# Patient Record
Sex: MALE | Race: WHITE
[De-identification: names, ages, dates, MRNs, and addresses within clinical notes are randomized per-mention and may not be internally consistent; named-entity substitution may affect disease eponyms.]

---

## 2020-05-10 ENCOUNTER — HOSPITAL ENCOUNTER (INPATIENT)
Dept: HOSPITAL 88 - ER | Age: 75
LOS: 5 days | Discharge: HOME | DRG: 603 | End: 2020-05-15
Attending: FAMILY MEDICINE | Admitting: FAMILY MEDICINE
Payer: MEDICARE

## 2020-05-10 VITALS — DIASTOLIC BLOOD PRESSURE: 86 MMHG | SYSTOLIC BLOOD PRESSURE: 128 MMHG

## 2020-05-10 VITALS — DIASTOLIC BLOOD PRESSURE: 78 MMHG | SYSTOLIC BLOOD PRESSURE: 119 MMHG

## 2020-05-10 VITALS — SYSTOLIC BLOOD PRESSURE: 119 MMHG | DIASTOLIC BLOOD PRESSURE: 78 MMHG

## 2020-05-10 VITALS — WEIGHT: 23 LBS | HEIGHT: 76 IN | BODY MASS INDEX: 2.8 KG/M2

## 2020-05-10 VITALS — SYSTOLIC BLOOD PRESSURE: 128 MMHG | DIASTOLIC BLOOD PRESSURE: 86 MMHG

## 2020-05-10 DIAGNOSIS — J44.9: ICD-10-CM

## 2020-05-10 DIAGNOSIS — S61.250D: ICD-10-CM

## 2020-05-10 DIAGNOSIS — E78.5: ICD-10-CM

## 2020-05-10 DIAGNOSIS — I25.10: ICD-10-CM

## 2020-05-10 DIAGNOSIS — M35.3: ICD-10-CM
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
## 2020-05-10 DIAGNOSIS — L03.113: Primary | ICD-10-CM

## 2020-05-10 DIAGNOSIS — N17.9: ICD-10-CM

## 2020-05-10 LAB
ALBUMIN SERPL-MCNC: 3 G/DL (ref 3.5–5)
ALBUMIN/GLOB SERPL: 0.9 {RATIO} (ref 0.8–2)
ALP SERPL-CCNC: 89 IU/L (ref 40–150)
ALT SERPL-CCNC: 13 IU/L (ref 0–55)
ANION GAP SERPL CALC-SCNC: 13.3 MMOL/L (ref 8–16)
BASOPHILS # BLD AUTO: 0.1 10*3/UL (ref 0–0.1)
BASOPHILS NFR BLD AUTO: 0.4 % (ref 0–1)
BUN SERPL-MCNC: 21 MG/DL (ref 7–26)
BUN/CREAT SERPL: 15 (ref 6–25)
CALCIUM SERPL-MCNC: 8.2 MG/DL (ref 8.4–10.2)
CHLORIDE SERPL-SCNC: 109 MMOL/L (ref 98–107)
CO2 SERPL-SCNC: 24 MMOL/L (ref 22–29)
DEPRECATED NEUTROPHILS # BLD AUTO: 12.5 10*3/UL (ref 2.1–6.9)
EGFRCR SERPLBLD CKD-EPI 2021: 48 ML/MIN (ref 60–?)
EOSINOPHIL # BLD AUTO: 0 10*3/UL (ref 0–0.4)
EOSINOPHIL NFR BLD AUTO: 0.1 % (ref 0–6)
ERYTHROCYTE [DISTWIDTH] IN CORD BLOOD: 22.8 % (ref 11.7–14.4)
GLOBULIN PLAS-MCNC: 3.4 G/DL (ref 2.3–3.5)
GLUCOSE SERPLBLD-MCNC: 105 MG/DL (ref 74–118)
HCT VFR BLD AUTO: 44.8 % (ref 38.2–49.6)
HGB BLD-MCNC: 13.7 G/DL (ref 14–18)
LYMPHOCYTES # BLD: 1 10*3/UL (ref 1–3.2)
LYMPHOCYTES NFR BLD AUTO: 7.3 % (ref 18–39.1)
MCH RBC QN AUTO: 25.9 PG (ref 28–32)
MCHC RBC AUTO-ENTMCNC: 30.6 G/DL (ref 31–35)
MCV RBC AUTO: 84.7 FL (ref 81–99)
MONOCYTES # BLD AUTO: 0.3 10*3/UL (ref 0.2–0.8)
MONOCYTES NFR BLD AUTO: 2.1 % (ref 4.4–11.3)
NEUTS SEG NFR BLD AUTO: 88.6 % (ref 38.7–80)
PLATELET # BLD AUTO: 250 X10E3/UL (ref 140–360)
POTASSIUM SERPL-SCNC: 4.3 MMOL/L (ref 3.5–5.1)
RBC # BLD AUTO: 5.29 X10E6/UL (ref 4.3–5.7)
SODIUM SERPL-SCNC: 142 MMOL/L (ref 136–145)

## 2020-05-10 PROCEDURE — 87040 BLOOD CULTURE FOR BACTERIA: CPT

## 2020-05-10 PROCEDURE — 85025 COMPLETE CBC W/AUTO DIFF WBC: CPT

## 2020-05-10 PROCEDURE — 36415 COLL VENOUS BLD VENIPUNCTURE: CPT

## 2020-05-10 PROCEDURE — 80048 BASIC METABOLIC PNL TOTAL CA: CPT

## 2020-05-10 PROCEDURE — 87635 SARS-COV-2 COVID-19 AMP PRB: CPT

## 2020-05-10 PROCEDURE — 94640 AIRWAY INHALATION TREATMENT: CPT

## 2020-05-10 PROCEDURE — 99284 EMERGENCY DEPT VISIT MOD MDM: CPT

## 2020-05-10 PROCEDURE — 80053 COMPREHEN METABOLIC PANEL: CPT

## 2020-05-10 RX ADMIN — Medication SCH MG: at 20:30

## 2020-05-10 RX ADMIN — CEFEPIME SCH MLS/HR: 1 INJECTION, SOLUTION INTRAVENOUS at 16:19

## 2020-05-10 RX ADMIN — MUPIROCIN SCH GM: 20 OINTMENT TOPICAL at 20:31

## 2020-05-10 RX ADMIN — GUAIFENESIN AND DEXTROMETHORPHAN HYDROBROMIDE SCH EACH: 600; 30 TABLET, EXTENDED RELEASE ORAL at 20:30

## 2020-05-10 RX ADMIN — AMIODARONE HYDROCHLORIDE SCH MG: 200 TABLET ORAL at 20:31

## 2020-05-10 RX ADMIN — CARVEDILOL SCH MG: 3.12 TABLET, FILM COATED ORAL at 20:30

## 2020-05-10 RX ADMIN — FAMOTIDINE SCH MG: 20 TABLET, FILM COATED ORAL at 20:30

## 2020-05-10 NOTE — NUR
Received pt fro ER at 1645 in wheelchair. Pt is aox3 and able to verbalize needs. Denies any 
pain at this time. 0 s/s of acute distress noted at time of admission.  Pt is being admitted 
for cellulitis of right index finger. Notified Dr. Hannon of pt arrival to unit and received 
orders to restart all home medications. Spoke with daughter update her on patient condition.

## 2020-05-10 NOTE — DIAGNOSTIC IMAGING REPORT
Exam:  Finger 3 views



History:  Pain



Comparison: None.



Findings: No fracture or malalignment. Joint spaces preserved. No abnormal soft

tissue calcification or soft tissue defect.  Soft tissue swelling of the index

finger.



Impression:



No acute osseous abnormality. Soft tissue swelling.





Signed by: Dr. Andrew Palisch, M.D. on 5/10/2020 3:43 PM

## 2020-05-10 NOTE — NUR
RECEIVED PATIENT IN BEDSIDE SHIFT REPORT. PATIENT RESTING IN BED AT THIS TIME. NO PAIN 
REPORTED. REDNESS NOTED TO R INDEX FINGER AND SOME REDNESS GOING UP ARM. NO S&S OF DISTRESS 
NOTED. BED LOCKED IN LOWEST POSITION, SIDE RAILS UPX2, CALL LIGHT IN REACH.

## 2020-05-10 NOTE — XMS REPORT
Patient Summary Document

                             Created on: 05/10/2020



ALFREDA JENKINS

External Reference #: [id number]

: 1945

Sex: Male



Demographics





                          Address                   [address] [place]  [address]

 

                          Home Phone                [phone]

 

                          Preferred Language        Unknown

 

                          Marital Status            Unknown

 

                          Protestant Affiliation     Unknown

 

                          Race                      Unknown

 

                                        Additional Race(s)  

 

                          Ethnic Group              Unknown





Author





                          Author                    [facility] [place] [place]

 

                          Organization              [facility] [place] [place]

 

                          Address                   Unknown

 

                          Phone                     Unavailable







Support





                Name            Relationship    Address         Phone

 

                    LEILANIANA [place]  [address]                    [phone]

 

                    JOSE MANUEL BUTTERFIELD     [place]  [address]                    [phone]

 

                    [place]  [address]                    [phone]

 

                    [place]  [address]                    [phone]

 

                    [place]  [address]                    [phone]

 

                MARIO JENKINS   [place], [place]  [id number] [phone]







Care Team Providers





                    Care Team Member Name Role                Phone

 

                    SWEET, A LAIRD      Unavailable         Unavailable







Payers





             Payer Name   Policy Type  Policy Number Effective Date Expiration D

ate







Problems

This patient has no known problems.



Allergies, Adverse Reactions, Alerts





        Allergy Name Allergy Type Status  Severity Reaction(s) Onset Date Inacti

ve Date 

Treating Clinician                      Comments

 

        Penicillins DA      Active  MO              2018 00:00:00         

         

 

        Sulfa (Sulfonamide Antibiotics) DA      Active  U                00:00:00                  

 

        tetracycline DA      Active  U               2018 00:00:00        

          

 

        celecoxib DA      Active  U               2018 00:00:00           

       

 

        Penicillins DA      Active  MO              2018 00:00:00         

         

 

        Sulfa (Sulfonamide Antibiotics) DA      Active  U                00:00:00                  

 

        tetracycline DA      Active  U               2018 00:00:00        

          

 

        celecoxib DA      Active  U               2018 00:00:00           

       

 

        Penicillins DA      Active  MO              2018 00:00:00         

         

 

        Sulfa (Sulfonamide Antibiotics) DA      Active  U                00:00:00                  

 

        tetracycline DA      Active  U               2018 00:00:00        

          

 

        celecoxib DA      Active  U               2018 00:00:00           

       

 

        Penicillins DA      Active  MO              2018 00:00:00         

         

 

        Sulfa (Sulfonamide Antibiotics) DA      Active  U                00:00:00                  

 

        tetracycline DA      Active  U               2018 00:00:00        

          

 

        celecoxib DA      Active  U               2018 00:00:00           

       







Medications

This patient has no known medications.



Results





           Test Description Test Time  Test Comments Text Results Atomic Results

 Result 

Comments

 

                FINGER RIGHT    2020-05-10 15:42:00                             

                                 

                                        [place] [address]      
Patient Name: ALFREDA JENKINS                                MR #: [id number]  
               : 1945                                   Age/Sex: 74/M  
Acct #: [id number]                              Req #: [id number]  Adm 
Physician:                                                      Ordered by: 
ALFREDA ZHANG NP                         Report #: [id number]        Location:
[facility]                                      Room/Bed:                   
________________________________________________________________________________

___________________    Procedure: [id number] DX/FINGER RIGHT  Exam Date: 05/10/20
                           Exam Time: 1450                                      
       REPORT STATUS: Signed       Exam:  Finger 3 views      History:  Pain    
 Comparison: None.      Findings: No fracture or malalignment. Joint spaces 
preserved. No abnormal soft   tissue calcification or soft tissue defect.  Soft 
tissue swelling of the index   finger.      Impression:      No acute osseous 
abnormality. Soft tissue swelling.         Signed by: Dr. Andrew Palisch, M.D. 
on 5/10/2020 3:43 PM        Dictated By: ANDREW R PALISCH MD  Electronically 
Signed By: ANDREW R PALISCH MD on 05/10/20 2349  Transcribed By: DAVINA on 
05/10/20 5725       COPY TO:   ALFREDA ZAHNG NP           

 

                LUNG,BIOPSY     2019 15:20:00                 

--------------------------------------------------------------------------------

------------RUN DATE: 19                         [facility]           
              PAGE 1   RUN TIME: 1520                            Specimen 
Inquiry                    RUN USER: INTERFACE                                  
                        
----------------------------------------------------------------
----------------------------PATIENT: ALFREDA JENKINS              ACCT #: 
[id number] LOC:  JOSE       #: [id number]                                   
   AGE/SX: 74/M         ROOM: [facility]     RE19REG DR:  Selvin Hannon MD            :    [birth year]     BED:  B          DIS: 19               
                       STATUS: DIS IN       TLOC:           
----------------------------
---------------------------------------------------------------- SPEC #: 
[id number]     RECD:      STATUS:  NINOSKA           REALEXANDR #: 
[id number]                           TORRES: 19-         [facility] DR: 
Von Eason MD            ENTERED:      SP TYPE: LUNG BX      
 OTHR DR: Romero Baugh MD, David A MD Vasquez Donado, Andres F MDORDERED:  GROSS                       
                                                      COPIES TO:   
Von Eason MD   [address] [place] [address] [phone]    
Romero Baugh MD   [address]   [address]   [place] [address] [phone]    Star Sierra MD   [address] [facility] [place] [address]   
[phone]    Rashaun Wyman MD   [address]   
[place] [address]   [phone] PROCEDURES: GROSS () TISSUES:    
      RIGHT LUNG, NOS - MASS BX         CLINICAL HISTORY    COLLECTION DATE: 
2019       HISTORY OF LUNG MASS, POSSIBLE TUBERCULOSIS (TB)             
COMMENT   Clinical correlation is recommended.                                  
 ** CONTINUED ON NEXT PAGE ** 
--------------------------------------------------------------------------------

------------RUN DATE: 19                         [facility]           
              PAGE 2   RUN TIME: 1520                            Specimen 
Inquiry                    RUN USER: INTERFACE                                  
                        --------------------
------------------------------------------------------------------------SPEC #: 
[id number]    PATIENT: ALFREDA JENKINS               #[id number]  
(Continued)--------------------------------------
------------------------------------------------------          FINAL DIAGNOSIS 
  Right lung mass, core biopsy and touch preps:        LUNG TISSUE WITH CHRONIC 
INFLAMMATION, INCREASED FIBROUS TISSUE AND          A NECROTIZING GRANULOMA     
  NEGATIVE FOR ACID FAST AND FUNGAL ORGANISMS        NEGATIVE FOR MALIGNANCY    
      [facility]/   D   [id number], [id number], [id number]           MACROSCOPIC    The specimen is
received in formalin and labeled with the patient's name and   identified as 
"right lung mass bx", and consists of multiple tan soft core   biopsies ranging 
from 0.3 to 0.8 cm.  The specimen is filtered and entirely   submitted in a 
single cassette.  Also received are three touch prep slides for   cytologic 
evaluation.           GROSS PERFORMED AT [facility]   [address] [place] [address]   
[phone]           MICROSCOPIC    The sections demonstrate lung tissue 
with chronic inflammation consisting of   lymphocytes and plasma cells.  There 
is an increased amount of fibrous tissue   present in the alveolar walls.  There
is an area of necrotizing granuloma   formation.  An acid fast and a GMS stain 
are prepared on the core biopsy and an   acid fast stain is prepared on one the 
touch prep slides and a GMS stain is   prepared on another touch prep slide.  
The remaining touch prep slide is   stained with a PAP stain and demonstrates 
benign pulmonary macrophages, some   white blood cells, and red blood cells, and
a giant cell.  The GMS and acid   fast stains are negative in the touch preps 
and in the core biopsy sections.             All of the stains, including any 
controls performed, stain   appropriately.       MICROSCOPIC PERFORMED AT [facility]   [address] [facility] [place] [address] [phone]                                ** CONTINUED
ON NEXT PAGE ** 
--------------------------------------------------------------------------------

------------RUN DATE: 19                         [facility]           
              PAGE 3   RUN TIME: 1520                            Specimen 
Inquiry                    RUN USER: INTERFACE                                  
                        
--------------------------------------------------------------------------------

------------SPEC #: [id number]    PATIENT: ALFREDA JENKINS               
#[id number]  (Cont
inued)--------------------------------------------------------------------------

------------------          PERFORMING SITE    Diagnosis performed at:        
[facility]      
 [address] [place] [address]        
[phone]--------------------------------------------------------------------

------------------------ Signed SIGNATURE ON FILE                        
Ceci Mirza MD 19 1520 
--------------------------------------------------------------------------------

------------                                                                    
   ** END OF REPORT **                   

 

                AG HISTOPLASMA UA 2019 13:52:00                   

 

   

 

                AG HISTOPLASMA UA (test code = HISUAAG) <0.5 EIA UNIT   <1.0=NEG

         





SOURCE:  [facility] QUANTIFERON [id number] 19:08:00* 



                Test Item       Value           Reference Range Comments

 

                QUANTIFERON TEST (test code = QFTT) Negative        Negative    

    The specimen received for 

QuantiFERON testing was incubatedby the ordering institution.  Specific 
procedures outlinedin our Directory of Services and in the package insert forthe
QuantiFERON Gold (In Tube) test must be followed toenable for proper stimulation
of cells for the productionof interferon gamma.Performed At:  [facility] [id number] Sanjai MD P
h:[id number]





- XR CHEST 1 -80-09 17:52:00 FAX: Selvin De MD   [phone]
   [place]:    St: ADM----------
---------------------------------------------------------------------  Name:   ALFREDA SOLANO                 [facility]                     : 19
[birth year]  Age/S: 74/M           [address] [facility]                Unit #: [id number]    
 Loc: [address]       [place]  [address]              Phys: Rusty Shah MD        
                                              Acct: [id number] Dis Date:      
        Status: ADM IN                                 PHONE #: [phone]    
Exam Date:     2019     1720                   FAX #: [phone]     
Reason: POST LUNG BX                                       EXAMS:               
                               CPT CODE:      [id number] XR CHEST 1 V            
                  11731                            REASON FOR EXAM: POST LUNG BX
              Exam Order Date: 2019 4:56 PM               Ordering M.D.: 
Rusty Shah MD               PROCEDURE:  - XR CHEST 1 V               COMPARISON:
AP chest x-ray earlier today at 12:11 PM               FINDINGS:         Prev
iously seen right apical pneumothorax has resolved. Spiculated       lesion in t
he right upper lobe is stable appearing. Remainder of the       lungs appear chelsea
ssly clear.               Left subclavian vein approach ICD/pacemaker is stable.
Lungs are       clear.               Cardiac mediastinal silhouette is enlarged 
but stable appearing. Bones       are unchanged.                         IMPRES
MARIE: Previously seen right apical pneumothorax has resolved.         Remaining 
findings are unchanged.          ** Electronically Signed by Maxi Coronado MD on 0
2019 at 4964 **                      Reported and signed by: Maxi Coronado MD
              CC: Selvin Hannon                                                
                                                                         Techn
ologist: Kaelyn RIVAS)                                Trnscrd Date/Hoang
e/By: 2019 (606) : By: LocRR31          Orig Print D/T: S: 2019 
(7391)                         PAGE  1                       Signed Report      
                        - CT GUID NDL [id number] 16:20:00  Name: 
ALFREDA JENKINS                  [facility]                     : 
1945 Age/S: 74  / M         [address] [facility]                Unit #: [id number]
[id number]     Loc:               [place]  [address]              Phys: Alana Hannon am, MD                                                  Acct: [id number]  Di
s Date:               Status: ADM IN                                  PHONE #: [phone]
[phone]     Exam Date: 2019                     FAX #: [phone]
[phone]      Reason: LUNG BIOPSY                                         EXAMS:     
                                         CPT CODE:      [id number] CT GUID NDL P
[facility]                          [address]                            REASON FOR EXAM: 
Right upper lobe lung mass.               PROCEDURE: CT-guided biopsy of right u
pper lobe lung mass               Face-to-face procedure time is approximately: 
45 minutes               FINDINGS: After informed consent was obtained, the jolene
ent was brought       to CT and placed supine on the table.   All elements of [facility]       sterile barrier technique were followed.  Prior to the biopsy, axial
      images of the right upper lobe were obtained without intravenous       co
ntrast.  Images of the CT were reviewed and the right upper lobe       mass was 
localized.  A safe pathway was found between the subcutaneous       entry site a
nd the right upper lobe mass. The access site was prepped       and draped in th
e usual sterile fashion. A guiding needle was advanced       into right upper lo
be mass. Multiple core biopsies were then performed       using a 20-gauge biops
y gun. Samples were submitted for cytology,       microbiology, and flow cytomet
ry. The needle was removed and       hemostasis obtained.               MEDICATI
ONS: 1mg versed 25mcg fentanyl               COMPLICATIONS: No immediate        
      Blood loss: Less than 5 mL               Fluoroscopic dose:640 mGy        
        IMPRESSION: Technically successful CT-guided biopsy of right upper      
  lobe mass.          ** Electronically Signed by PEPE Shah on 2019 at 
1620 **                      Reported and signed by: Rusty Shah M.D.         CC: 
Selvin Hannon                                                                  
                                                       Technologist:Senait hernández,RT(R),CT            CTDI:        DLP:        Trnscb Date/Time: 2019 (16
20) LocVTL                        Orig Print D/T: S: 2019 (4272)      P
AGE  1                       Signed Report                               - XR 
CHEST 1 -51-76 12:49:00 FAX: Selvin De MD   [phone]    
[place]:    St: ADM----------
---------------------------------------------------------------------  Name:   ALFREDA SOLANO                 [facility]                     : 19
[birth year]  Age/S: 74/M           [address] [facility]                Unit #: [id number]    
 Loc: [address]       [place]  [address]              Phys: Rusty Shah MD        
                                              Acct: [id number] Dis Date:      
        Status: ADM IN                                 PHONE #: [phone]    
Exam Date:     2019     1211                   FAX #: [phone]     
Reason: POST LUNG BX                                       EXAMS:               
                               CPT CODE:      [id number] XR CHEST 1 V            
                  [address]                    HISTORY: Post lung biopsy.           
   COMPARISON: 2019.               Trace 5% pneumothorax in the right 
apex. Follow-up with expiratory       films for confirmation. Left ICD is unc
hanged. Right upper lobe mass       is ill-defined after biopsy due to surroundi
ng small hemorrhage.       Bibasal subsegmental atelectasis without infiltrates 
or congestion.       Cardiomegaly                 IMPRESSION:                   
Trace 5% pneumothorax in the right apex of the biopsy. Confirmation         with
repeat exam in expiratory phase. These findings were called to         the spec
ial procedures at 12:48 PM.          ** Electronically Signed by PEPE cooney on 2019 at 8301 **                      Reported and signed by: Jarad robert M.D.                         CC: Selvin Hannon                          
                                                                                
              Technologist: HANY MANSFIELD JR                                     
  Trnscrd Date/Time/By: 2019 (1957) : By: [email or web address]           Orig Print 
D/T: S: 2019 (0601)                         PAGE  1                       
Signed Report                               B-TYPE NATRIURETIC [id number]
06:04:00* 



                Test Item       Value           Reference Range Comments

 

                B-TYPE NATRIURETIC PEPTIDE (test code = BNP) 861.01 pgram/mL 0-1

00            





Has Patient received Natrecor? NOCBC W/AUTO [id number] 06:03:00* 



                Test Item       Value           Reference Range Comments

 

                WHITE BLOOD CELL (test code = WBC) 15.6 K/mm3      4.5-12.5     

    

 

                RED BLOOD CELL (test code = RBC) 4.63 mill/mm3   4.0-5.8        

  

 

                HEMOGLOBIN (test code = HGB) 12.2 gram/dL    13.0-17.5        

 

                HEMATOCRIT (test code = HCT) 38.9 %          42.0-52.0        

 

                MEAN CELL VOLUME (test code = MCV) 84.0 fL         80-98        

    

 

                MEAN CELL HGB (test code = MCH) 26.3 picogram   27.0-33.0       

 

 

                MEAN CELL HGB CONCETRATION (test code = MCHC) 31.4 gram/dL    33

.0-36.0        

 

                RED CELL DISTRIBUTION WIDTH (test code = RDW) 21.7 %          11

.6-16.2        

 

                RED CELL DISTRIBUTION WIDTH SD (test code = RDW-SD) 66.0 fL     

    37.0-51.0        

 

                PLATELET COUNT (test code = PLT) 234 K/mm3       150-450        

  

 

                MEAN PLATELET VOLUME (test code = MPV) 9.5 fL          6.7-11.0 

        

 

                NEUTROPHIL % (test code = NT%) 78.0 %          39.0-69.0        

 

                IMMATURE GRANULOCYTE % (test code = IG%) 2.8 %           0.0-5.0

          

 

                LYMPHOCYTE % (test code = LY%) 11.7 %          25.0-55.0        

 

                MONOCYTE % (test code = MO%) 6.1 %           0.0-10.0         

 

                EOSINOPHIL % (test code = EO%) 1.0 %           0.0-5.0          

 

                BASOPHIL % (test code = BA%) 0.4 %           0.0-1.0          

 

                NUCLEATED RBC % (test code = NRBC%) 0.0 %           0-0         

     

 

                NEUTROPHIL # (test code = NT#) 12.17 K/mm3     1.8-7.7          

 

                IMMATURE GRANULOCYTE # (test code = IG#) 0.44 x10 3/uL   0-0.03 

          

 

                LYMPHOCYTE # (test code = LY#) 1.83 K/mm3      1.0-5.0          

 

                MONOCYTE # (test code = MO#) 0.95 K/mm3      0-0.8            

 

                EOSINOPHIL # (test code = EO#) 0.15 K/mm3      0.0-0.5          

 

                BASOPHIL # (test code = BA#) 0.06 K/mm3      0.0-0.2          

 

                NUCLEATED RBC # (test code = NRBC#) 0.00 K/mm3      0.0-0.1     

     

 

                MANUAL DIFF REQUIRED (test code = MDIFF) NO, ONLY SCAN NEEDED   

               





DIFFERENTIAL [id number] 06:03:00* 



                Test Item       Value           Reference Range Comments

 

                STAIN ACCEPTABILITY (test code = STN ACCEPTABLE) STAIN ACCEPTABL

E                  

 

                POIKILOCYTOSIS (test code = POIK) 3+                            

   

 

                ANISOCYTOSIS (test code = ANISO) 1+                             

  

 

                ELLIPTOCYTES (test code = ELL) 2+                               

 

                MIGDALIA CELLS (test code = MIGDALIA) 2+              NONE             

 

                PLATELET ESTIMATE (test code = PLTEST) ADEQUATE                 

        

 

                PLATELET MORPHOLOGY (test code = PLTMORPH) NORMAL               

            





BASIC METABOLIC [id number] 05:52:00* 



                Test Item       Value           Reference Range Comments

 

                SODIUM (test code = NA) 141 mmol/L      136-145          

 

                POTASSIUM (test code = K) 4.4 mmol/L      3.5-5.1          

 

                CHLORIDE (test code = CL) 108.0 mmol/L               

 

                CARBON DIOXIDE (test code = CO2) 27.0 mmol/L     21-32          

  

 

                ANION GAP (test code = GAP) 10.4            10-20            

 

                GLUCOSE (test code = GLU) 83 mg/dL                   

 

                BLOOD UREA NITROGEN (test code = BUN) 18 mg/dL        7-18      

       

 

                GLOMERULAR FILTRATION RATE (test code = GFR) 54 mL/min       >=6

0            Estimated GFR by 

using Modified MDRD formula.Chronic kidney disease is defined as either kidney 
damageor GFR <60 mL/min/1.73 m2 for >3 months.

 

                CREATININE (test code = CREAT) 1.30 mg/dL      0.7-1.3          

 

                BUN/CREATININE RATIO (test code = BUN/CREA) 14.2            10-2

0            

 

                CALCIUM (test code = CA) 8.4 mg/dL       8.5-10.1         





[id number] 05:52:00* 



                Test Item       Value           Reference Range Comments

 

                MAGNESIUM (test code = MAG) 2.0 mg/dL       1.8-2.4          





BASIC METABOLIC [id number] 05:50:00* 



                Test Item       Value           Reference Range Comments

 

                SODIUM (test code = NA) 141 mmol/L      136-145          

 

                POTASSIUM (test code = K) 4.4 mmol/L      3.5-5.1          

 

                CHLORIDE (test code = CL) 108.0 mmol/L               

 

                CARBON DIOXIDE (test code = CO2)  mmol/L         21-32          

  

 

                ANION GAP (test code = GAP)                 10-20            

 

                GLUCOSE (test code = GLU)  mg/dL                     

 

                BLOOD UREA NITROGEN (test code = BUN)  mg/dL          7-18      

       

 

                GLOMERULAR FILTRATION RATE (test code = GFR)  mL/min         >=6

0             

 

                CREATININE (test code = CREAT)  mg/dL          0.7-1.3          

 

                BUN/CREATININE RATIO (test code = BUN/CREA)                 10-2

0            

 

                CALCIUM (test code = CA) 8.4 mg/dL       8.5-10.1         





[id number] 05:50:00* 



                Test Item       Value           Reference Range Comments

 

                MAGNESIUM (test code = MAG)  mg/dL          1.8-2.4          





THROMBOPLASTIN TIME [id number] 05:30:00* 



                Test Item       Value           Reference Range Comments

 

                THROMBOPLASTIN TIME PARTIAL (test code = PTT) 32.0 seconds    25

.0-36.5        





IS PATIENT ON ANTICOAGULANTS? YLIST ANTICOAGULANTS     ASPIRIN     PLAVIXCOMMENT
S TO PHLEBOTOMIST: NEED FOR SURGERY 19PROTHROMBIN [id number] 05:23:00
  * 



                Test Item       Value           Reference Range Comments

 

                PROTHROMBIN TIME PATIENT (test code = PTP) 15.6 seconds    9.0-1

4.0         

 

                INTERNATIONAL NORMAL RATIO (test code = INR) 1.3             0.8

-1.2         The therapeutic range 

for oral anticoagulant therapy formost indications is an international 
normalized ratio (INR)of between 2.0 and 3.0.  The recommended therapeutic 
INRrange for various clinical situations is listed 
below:_________________________________________________________Clinical 
Situation                          INR 
range_________________________________________________________ Pulmonary e
mbolism treatment              (2.0-3.0)Venous thrombosis treatmentVenous 
thrombosis prophylaxis (high risk surgery)Prevention of systemic embolism from: 
       Acute myocardial infarction         Valvular heart disease         Atrial
fibrillation Mechanical prosthetic heart valves          (2.5-3.5)





IS PATIENT ON ANTICOAGULANTS? NCBC W/AUTO [id number] 05:21:00* 



                Test Item       Value           Reference Range Comments

 

                WHITE BLOOD CELL (test code = WBC) 15.6 K/mm3      4.5-12.5     

    

 

                RED BLOOD CELL (test code = RBC) 4.63 mill/mm3   4.0-5.8        

  

 

                HEMOGLOBIN (test code = HGB) 12.2 gram/dL    13.0-17.5        

 

                HEMATOCRIT (test code = HCT) 38.9 %          42.0-52.0        

 

                MEAN CELL VOLUME (test code = MCV) 84.0 fL         80-98        

    

 

                MEAN CELL HGB (test code = MCH) 26.3 picogram   27.0-33.0       

 

 

                MEAN CELL HGB CONCETRATION (test code = MCHC) 31.4 gram/dL    33

.0-36.0        

 

                RED CELL DISTRIBUTION WIDTH (test code = RDW) 21.7 %          11

.6-16.2        

 

                RED CELL DISTRIBUTION WIDTH SD (test code = RDW-SD) 66.0 fL     

    37.0-51.0        

 

                PLATELET COUNT (test code = PLT) 234 K/mm3       150-450        

  

 

                MEAN PLATELET VOLUME (test code = MPV) 9.5 fL          6.7-11.0 

        

 

                NEUTROPHIL % (test code = NT%) 78.0 %          39.0-69.0        

 

                IMMATURE GRANULOCYTE % (test code = IG%) 2.8 %           0.0-5.0

          

 

                LYMPHOCYTE % (test code = LY%) 11.7 %          25.0-55.0        

 

                MONOCYTE % (test code = MO%) 6.1 %           0.0-10.0         

 

                EOSINOPHIL % (test code = EO%) 1.0 %           0.0-5.0          

 

                BASOPHIL % (test code = BA%) 0.4 %           0.0-1.0          

 

                NUCLEATED RBC % (test code = NRBC%) 0.0 %           0-0         

     

 

                NEUTROPHIL # (test code = NT#) 12.17 K/mm3     1.8-7.7          

 

                IMMATURE GRANULOCYTE # (test code = IG#) 0.44 x10 3/uL   0-0.03 

          

 

                LYMPHOCYTE # (test code = LY#) 1.83 K/mm3      1.0-5.0          

 

                MONOCYTE # (test code = MO#) 0.95 K/mm3      0-0.8            

 

                EOSINOPHIL # (test code = EO#) 0.15 K/mm3      0.0-0.5          

 

                BASOPHIL # (test code = BA#) 0.06 K/mm3      0.0-0.2          

 

                NUCLEATED RBC # (test code = NRBC#) 0.00 K/mm3      0.0-0.1     

     

 

                MANUAL DIFF REQUIRED (test code = MDIFF) NO, ONLY SCAN NEEDED   

               





DIFFERENTIAL [id number] 05:21:00* 



                Test Item       Value           Reference Range Comments

 

                STAIN ACCEPTABILITY (test code = STN ACCEPTABLE)                

                  

 

                CABOT RINGS (test code = CAB)                                  

 

                MORPHOLOGY COMMENT (test code = MOC)                            

      

 

                PLATELET ESTIMATE (test code = PLTEST)                          

        

 

                PLATELET MORPHOLOGY (test code = PLTMORPH)                      

            





CBC W/AUTO [id number] 05:21:00* 



                Test Item       Value           Reference Range Comments

 

                WHITE BLOOD CELL (test code = WBC) 15.6 K/mm3      4.5-12.5     

    

 

                RED BLOOD CELL (test code = RBC) 4.63 mill/mm3   4.0-5.8        

  

 

                HEMOGLOBIN (test code = HGB) 12.2 gram/dL    13.0-17.5        

 

                HEMATOCRIT (test code = HCT) 38.9 %          42.0-52.0        

 

                MEAN CELL VOLUME (test code = MCV) 84.0 fL         80-98        

    

 

                MEAN CELL HGB (test code = MCH) 26.3 picogram   27.0-33.0       

 

 

                MEAN CELL HGB CONCETRATION (test code = MCHC) 31.4 gram/dL    33

.0-36.0        

 

                RED CELL DISTRIBUTION WIDTH (test code = RDW) 21.7 %          11

.6-16.2        

 

                RED CELL DISTRIBUTION WIDTH SD (test code = RDW-SD) 66.0 fL     

    37.0-51.0        

 

                PLATELET COUNT (test code = PLT) 234 K/mm3       150-450        

  

 

                MEAN PLATELET VOLUME (test code = MPV) 9.5 fL          6.7-11.0 

        

 

                NEUTROPHIL % (test code = NT%) 78.0 %          39.0-69.0        

 

                IMMATURE GRANULOCYTE % (test code = IG%) 2.8 %           0.0-5.0

          

 

                LYMPHOCYTE % (test code = LY%) 11.7 %          25.0-55.0        

 

                MONOCYTE % (test code = MO%) 6.1 %           0.0-10.0         

 

                EOSINOPHIL % (test code = EO%) 1.0 %           0.0-5.0          

 

                BASOPHIL % (test code = BA%) 0.4 %           0.0-1.0          

 

                NUCLEATED RBC % (test code = NRBC%) 0.0 %           0-0         

     

 

                NEUTROPHIL # (test code = NT#) 12.17 K/mm3     1.8-7.7          

 

                IMMATURE GRANULOCYTE # (test code = IG#) 0.44 x10 3/uL   0-0.03 

          

 

                LYMPHOCYTE # (test code = LY#) 1.83 K/mm3      1.0-5.0          

 

                MONOCYTE # (test code = MO#) 0.95 K/mm3      0-0.8            

 

                EOSINOPHIL # (test code = EO#) 0.15 K/mm3      0.0-0.5          

 

                BASOPHIL # (test code = BA#) 0.06 K/mm3      0.0-0.2          

 

                NUCLEATED RBC # (test code = NRBC#) 0.00 K/mm3      0.0-0.1     

     

 

                MANUAL DIFF REQUIRED (test code = MDIFF) NO, ONLY SCAN NEEDED   

               





DIFFERENTIAL [id number] 05:21:00* 



                Test Item       Value           Reference Range Comments

 

                STAIN ACCEPTABILITY (test code = STN ACCEPTABLE)                

                  

 

                CABOT RINGS (test code = CAB)                                  

 

                MORPHOLOGY COMMENT (test code = MOC)                            

      

 

                PLATELET ESTIMATE (test code = PLTEST)                          

        

 

                PLATELET MORPHOLOGY (test code = PLTMORPH)                      

            





CBC W/AUTO [id number] 05:21:00* 



                Test Item       Value           Reference Range Comments

 

                WHITE BLOOD CELL (test code = WBC) 15.6 K/mm3      4.5-12.5     

    

 

                RED BLOOD CELL (test code = RBC) 4.63 mill/mm3   4.0-5.8        

  

 

                HEMOGLOBIN (test code = HGB) 12.2 gram/dL    13.0-17.5        

 

                HEMATOCRIT (test code = HCT) 38.9 %          42.0-52.0        

 

                MEAN CELL VOLUME (test code = MCV) 84.0 fL         80-98        

    

 

                MEAN CELL HGB (test code = MCH) 26.3 picogram   27.0-33.0       

 

 

                MEAN CELL HGB CONCETRATION (test code = MCHC) 31.4 gram/dL    33

.0-36.0        

 

                RED CELL DISTRIBUTION WIDTH (test code = RDW) 21.7 %          11

.6-16.2        

 

                RED CELL DISTRIBUTION WIDTH SD (test code = RDW-SD) 66.0 fL     

    37.0-51.0        

 

                PLATELET COUNT (test code = PLT) 234 K/mm3       150-450        

  

 

                MEAN PLATELET VOLUME (test code = MPV) 9.5 fL          6.7-11.0 

        

 

                NEUTROPHIL % (test code = NT%) 78.0 %          39.0-69.0        

 

                IMMATURE GRANULOCYTE % (test code = IG%) 2.8 %           0.0-5.0

          

 

                LYMPHOCYTE % (test code = LY%) 11.7 %          25.0-55.0        

 

                MONOCYTE % (test code = MO%) 6.1 %           0.0-10.0         

 

                EOSINOPHIL % (test code = EO%) 1.0 %           0.0-5.0          

 

                BASOPHIL % (test code = BA%) 0.4 %           0.0-1.0          

 

                NUCLEATED RBC % (test code = NRBC%) 0.0 %           0-0         

     

 

                NEUTROPHIL # (test code = NT#) 12.17 K/mm3     1.8-7.7          

 

                IMMATURE GRANULOCYTE # (test code = IG#) 0.44 x10 3/uL   0-0.03 

          

 

                LYMPHOCYTE # (test code = LY#) 1.83 K/mm3      1.0-5.0          

 

                MONOCYTE # (test code = MO#) 0.95 K/mm3      0-0.8            

 

                EOSINOPHIL # (test code = EO#) 0.15 K/mm3      0.0-0.5          

 

                BASOPHIL # (test code = BA#) 0.06 K/mm3      0.0-0.2          

 

                NUCLEATED RBC # (test code = NRBC#) 0.00 K/mm3      0.0-0.1     

     

 

                MANUAL DIFF REQUIRED (test code = MDIFF) NO, ONLY SCAN NEEDED   

               





DIFFERENTIAL [id number] 05:21:00* 



                Test Item       Value           Reference Range Comments

 

                STAIN ACCEPTABILITY (test code = STN ACCEPTABLE)                

                  

 

                MORPHOLOGY COMMENT (test code = MOC)                            

      

 

                PLATELET ESTIMATE (test code = PLTEST)                          

        

 

                PLATELET MORPHOLOGY (test code = PLTMORPH)                      

            





CBC W/AUTO [id number] 05:21:00* 



                Test Item       Value           Reference Range Comments

 

                WHITE BLOOD CELL (test code = WBC) 15.6 K/mm3      4.5-12.5     

    

 

                RED BLOOD CELL (test code = RBC) 4.63 mill/mm3   4.0-5.8        

  

 

                HEMOGLOBIN (test code = HGB) 12.2 gram/dL    13.0-17.5        

 

                HEMATOCRIT (test code = HCT) 38.9 %          42.0-52.0        

 

                MEAN CELL VOLUME (test code = MCV) 84.0 fL         80-98        

    

 

                MEAN CELL HGB (test code = MCH) 26.3 picogram   27.0-33.0       

 

 

                MEAN CELL HGB CONCETRATION (test code = MCHC) 31.4 gram/dL    33

.0-36.0        

 

                RED CELL DISTRIBUTION WIDTH (test code = RDW) 21.7 %          11

.6-16.2        

 

                RED CELL DISTRIBUTION WIDTH SD (test code = RDW-SD) 66.0 fL     

    37.0-51.0        

 

                PLATELET COUNT (test code = PLT) 234 K/mm3       150-450        

  

 

                MEAN PLATELET VOLUME (test code = MPV) 9.5 fL          6.7-11.0 

        

 

                NEUTROPHIL % (test code = NT%) 78.0 %          39.0-69.0        

 

                IMMATURE GRANULOCYTE % (test code = IG%) 2.8 %           0.0-5.0

          

 

                LYMPHOCYTE % (test code = LY%) 11.7 %          25.0-55.0        

 

                MONOCYTE % (test code = MO%) 6.1 %           0.0-10.0         

 

                EOSINOPHIL % (test code = EO%) 1.0 %           0.0-5.0          

 

                BASOPHIL % (test code = BA%) 0.4 %           0.0-1.0          

 

                NUCLEATED RBC % (test code = NRBC%) 0.0 %           0-0         

     

 

                NEUTROPHIL # (test code = NT#) 12.17 K/mm3     1.8-7.7          

 

                IMMATURE GRANULOCYTE # (test code = IG#) 0.44 x10 3/uL   0-0.03 

          

 

                LYMPHOCYTE # (test code = LY#) 1.83 K/mm3      1.0-5.0          

 

                MONOCYTE # (test code = MO#) 0.95 K/mm3      0-0.8            

 

                EOSINOPHIL # (test code = EO#) 0.15 K/mm3      0.0-0.5          

 

                BASOPHIL # (test code = BA#) 0.06 K/mm3      0.0-0.2          

 

                NUCLEATED RBC # (test code = NRBC#) 0.00 K/mm3      0.0-0.1     

     

 

                MANUAL DIFF REQUIRED (test code = MDIFF) NO, ONLY SCAN NEEDED   

               





DIFFERENTIAL [id number] 05:21:00* 



                Test Item       Value           Reference Range Comments

 

                STAIN ACCEPTABILITY (test code = STN ACCEPTABLE)                

                  

 

                CABOT RINGS (test code = CAB)                                  

 

                MORPHOLOGY COMMENT (test code = MOC)                            

      

 

                PLATELET ESTIMATE (test code = PLTEST)                          

        

 

                PLATELET MORPHOLOGY (test code = PLTMORPH)                      

            





CBC W/AUTO [id number] 07:59:00* 



                Test Item       Value           Reference Range Comments

 

                WHITE BLOOD CELL (test code = WBC) 16.4 K/mm3      4.5-12.5     

    

 

                RED BLOOD CELL (test code = RBC) 4.56 mill/mm3   4.0-5.8        

  

 

                HEMOGLOBIN (test code = HGB) 12.3 gram/dL    13.0-17.5        

 

                HEMATOCRIT (test code = HCT) 39.7 %          42.0-52.0        

 

                MEAN CELL VOLUME (test code = MCV) 87.1 fL         80-98        

    

 

                MEAN CELL HGB (test code = MCH) 27.0 picogram   27.0-33.0       

 

 

                MEAN CELL HGB CONCETRATION (test code = MCHC) 31.0 gram/dL    33

.0-36.0        

 

                RED CELL DISTRIBUTION WIDTH (test code = RDW) 21.8 %          11

.6-16.2        

 

                RED CELL DISTRIBUTION WIDTH SD (test code = RDW-SD) 68.5 fL     

    37.0-51.0        

 

                PLATELET COUNT (test code = PLT) 220 K/mm3       150-450        

  

 

                MEAN PLATELET VOLUME (test code = MPV) 9.7 fL          6.7-11.0 

        

 

                NEUTROPHIL % (test code = NT%) 77.5 %          39.0-69.0        

 

                IMMATURE GRANULOCYTE % (test code = IG%) 2.2 %           0.0-5.0

          

 

                LYMPHOCYTE % (test code = LY%) 11.9 %          25.0-55.0        

 

                MONOCYTE % (test code = MO%) 6.5 %           0.0-10.0         

 

                EOSINOPHIL % (test code = EO%) 1.5 %           0.0-5.0          

 

                BASOPHIL % (test code = BA%) 0.4 %           0.0-1.0          

 

                NUCLEATED RBC % (test code = NRBC%) 0.0 %           0-0         

     

 

                NEUTROPHIL # (test code = NT#) 12.73 K/mm3     1.8-7.7          

 

                IMMATURE GRANULOCYTE # (test code = IG#) 0.36 x10 3/uL   0-0.03 

          

 

                LYMPHOCYTE # (test code = LY#) 1.96 K/mm3      1.0-5.0          

 

                MONOCYTE # (test code = MO#) 1.07 K/mm3      0-0.8            

 

                EOSINOPHIL # (test code = EO#) 0.25 K/mm3      0.0-0.5          

 

                BASOPHIL # (test code = BA#) 0.07 K/mm3      0.0-0.2          

 

                NUCLEATED RBC # (test code = NRBC#) 0.00 K/mm3      0.0-0.1     

     

 

                MANUAL DIFF REQUIRED (test code = MDIFF) NO, ONLY SCAN NEEDED   

               





DIFFERENTIAL SCAN2019-07-15 07:59:00* 



                Test Item       Value           Reference Range Comments

 

                STAIN ACCEPTABILITY (test code = STN ACCEPTABLE) STAIN ACCEPTABL

E                  

 

                POIKILOCYTOSIS (test code = POIK) 3+                            

   

 

                ANISOCYTOSIS (test code = ANISO) 1+                             

  

 

                MICROCYTOSIS (test code = MICR) 1+                              

 

 

                ELLIPTOCYTES (test code = ELL) 1+                               

 

                MIGDALIA CELLS (test code = MIGDALIA) 2+              NONE             

 

                ACANTHOCYTES (test code = ACAN) 1+              NONE            

 

 

                SICKLE CELLS (test code = SICKL) 1+                             

  

 

                PLATELET ESTIMATE (test code = PLTEST) ADEQUATE                 

        

 

                PLATELET MORPHOLOGY (test code = PLTMORPH) NORMAL               

            





COMPREHENSIVE METABOLIC PANEL2019-07-15 06:26:00* 



                Test Item       Value           Reference Range Comments

 

                SODIUM (test code = NA) 140 mmol/L      136-145          

 

                POTASSIUM (test code = K) 4.3 mmol/L      3.5-5.1          

 

                CHLORIDE (test code = CL) 109.0 mmol/L               

 

                CARBON DIOXIDE (test code = CO2) 23.0 mmol/L     21-32          

  

 

                ANION GAP (test code = GAP) 12.3            10-20            

 

                GLUCOSE (test code = GLU) 75 mg/dL                   

 

                BLOOD UREA NITROGEN (test code = BUN) 17 mg/dL        7-18      

       

 

                GLOMERULAR FILTRATION RATE (test code = GFR) 59 mL/min       >=6

0            Estimated GFR by 

using Modified MDRD formula.Chronic kidney disease is defined as either kidney 
damageor GFR <60 mL/min/1.73 m2 for >3 months.

 

                CREATININE (test code = CREAT) 1.20 mg/dL      0.7-1.3          

 

                BUN/CREATININE RATIO (test code = BUN/CREA) 14.2            10-2

0            

 

                TOTAL PROTEIN (test code = PROT) 5.8 gram/dL     6.4-8.2        

  

 

                ALBUMIN (test code = ALB) 2.4 g/dL        3.4-5.0          

 

                GLOBULIN (test code = GLOB) 3.4 gram/dL     2.7-4.2          

 

                ALBUMIN/GLOBULIN RATIO (test code = A/G) 0.7             0.75-1.

50        

 

                CALCIUM (test code = CA) 8.2 mg/dL       8.5-10.1         

 

                BILIRUBIN TOTAL (test code = BILT) 0.80 mg/dL      0.0-1.0      

    

 

                SGOT/AST (test code = AST) 16 IUnit/L      15-37            

 

                SGPT/ALT (test code = ALT) 19 IUnit/L      12-78            

 

                ALKALINE PHOSPHATASE TOTAL (test code = ALKP) 84 IUnit/L      45

-117          **Note change in

reference range due to change in reagent.**





COMPREHENSIVE METABOLIC PANEL2019-07-15 06:15:00* 



                Test Item       Value           Reference Range Comments

 

                SODIUM (test code = NA) 140 mmol/L      136-145          

 

                POTASSIUM (test code = K) 4.3 mmol/L      3.5-5.1          

 

                CHLORIDE (test code = CL) 109.0 mmol/L               

 

                CARBON DIOXIDE (test code = CO2)  mmol/L         21-32          

  

 

                ANION GAP (test code = GAP)                 10-20            

 

                GLUCOSE (test code = GLU)  mg/dL                     

 

                BLOOD UREA NITROGEN (test code = BUN)  mg/dL          7-18      

       

 

                GLOMERULAR FILTRATION RATE (test code = GFR)  mL/min         >=6

0             

 

                CREATININE (test code = CREAT)  mg/dL          0.7-1.3          

 

                BUN/CREATININE RATIO (test code = BUN/CREA)                 10-2

0            

 

                TOTAL PROTEIN (test code = PROT)  gram/dL        6.4-8.2        

  

 

                ALBUMIN (test code = ALB)  g/dL           3.4-5.0          

 

                GLOBULIN (test code = GLOB)  gram/dL        2.7-4.2          

 

                ALBUMIN/GLOBULIN RATIO (test code = A/G)                 0.75-1.

50        

 

                CALCIUM (test code = CA)  mg/dL          8.5-10.1         

 

                BILIRUBIN TOTAL (test code = BILT)  mg/dL          0.0-1.0      

    

 

                SGOT/AST (test code = AST)  IUnit/L        15-37            

 

                SGPT/ALT (test code = ALT)  IUnit/L        12-78            

 

                ALKALINE PHOSPHATASE TOTAL (test code = ALKP)  IUnit/L        45

-117           





CBC W/AUTO DIFF2019-07-15 05:47:00* 



                Test Item       Value           Reference Range Comments

 

                WHITE BLOOD CELL (test code = WBC) 16.4 K/mm3      4.5-12.5     

    

 

                RED BLOOD CELL (test code = RBC) 4.56 mill/mm3   4.0-5.8        

  

 

                HEMOGLOBIN (test code = HGB) 12.3 gram/dL    13.0-17.5        

 

                HEMATOCRIT (test code = HCT) 39.7 %          42.0-52.0        

 

                MEAN CELL VOLUME (test code = MCV) 87.1 fL         80-98        

    

 

                MEAN CELL HGB (test code = MCH) 27.0 picogram   27.0-33.0       

 

 

                MEAN CELL HGB CONCETRATION (test code = MCHC) 31.0 gram/dL    33

.0-36.0        

 

                RED CELL DISTRIBUTION WIDTH (test code = RDW) 21.8 %          11

.6-16.2        

 

                RED CELL DISTRIBUTION WIDTH SD (test code = RDW-SD) 68.5 fL     

    37.0-51.0        

 

                PLATELET COUNT (test code = PLT) 220 K/mm3       150-450        

  

 

                MEAN PLATELET VOLUME (test code = MPV) 9.7 fL          6.7-11.0 

        

 

                NEUTROPHIL % (test code = NT%) 77.5 %          39.0-69.0        

 

                IMMATURE GRANULOCYTE % (test code = IG%) 2.2 %           0.0-5.0

          

 

                LYMPHOCYTE % (test code = LY%) 11.9 %          25.0-55.0        

 

                MONOCYTE % (test code = MO%) 6.5 %           0.0-10.0         

 

                EOSINOPHIL % (test code = EO%) 1.5 %           0.0-5.0          

 

                BASOPHIL % (test code = BA%) 0.4 %           0.0-1.0          

 

                NUCLEATED RBC % (test code = NRBC%) 0.0 %           0-0         

     

 

                NEUTROPHIL # (test code = NT#) 12.73 K/mm3     1.8-7.7          

 

                IMMATURE GRANULOCYTE # (test code = IG#) 0.36 x10 3/uL   0-0.03 

          

 

                LYMPHOCYTE # (test code = LY#) 1.96 K/mm3      1.0-5.0          

 

                MONOCYTE # (test code = MO#) 1.07 K/mm3      0-0.8            

 

                EOSINOPHIL # (test code = EO#) 0.25 K/mm3      0.0-0.5          

 

                BASOPHIL # (test code = BA#) 0.07 K/mm3      0.0-0.2          

 

                NUCLEATED RBC # (test code = NRBC#) 0.00 K/mm3      0.0-0.1     

     

 

                MANUAL DIFF REQUIRED (test code = MDIFF) NO, ONLY SCAN NEEDED   

               





DIFFERENTIAL [id number] 05:47:00* 



                Test Item       Value           Reference Range Comments

 

                STAIN ACCEPTABILITY (test code = STN ACCEPTABLE)                

                  

 

                CABOT RINGS (test code = CAB)                                  

 

                MORPHOLOGY COMMENT (test code = MOC)                            

      

 

                PLATELET ESTIMATE (test code = PLTEST)                          

        

 

                PLATELET MORPHOLOGY (test code = PLTMORPH)                      

            





CBC W/AUTO [id number] 05:47:00* 



                Test Item       Value           Reference Range Comments

 

                WHITE BLOOD CELL (test code = WBC) 16.4 K/mm3      4.5-12.5     

    

 

                RED BLOOD CELL (test code = RBC) 4.56 mill/mm3   4.0-5.8        

  

 

                HEMOGLOBIN (test code = HGB) 12.3 gram/dL    13.0-17.5        

 

                HEMATOCRIT (test code = HCT) 39.7 %          42.0-52.0        

 

                MEAN CELL VOLUME (test code = MCV) 87.1 fL         80-98        

    

 

                MEAN CELL HGB (test code = MCH) 27.0 picogram   27.0-33.0       

 

 

                MEAN CELL HGB CONCETRATION (test code = MCHC) 31.0 gram/dL    33

.0-36.0        

 

                RED CELL DISTRIBUTION WIDTH (test code = RDW) 21.8 %          11

.6-16.2        

 

                RED CELL DISTRIBUTION WIDTH SD (test code = RDW-SD) 68.5 fL     

    37.0-51.0        

 

                PLATELET COUNT (test code = PLT) 220 K/mm3       150-450        

  

 

                MEAN PLATELET VOLUME (test code = MPV) 9.7 fL          6.7-11.0 

        

 

                NEUTROPHIL % (test code = NT%) 77.5 %          39.0-69.0        

 

                IMMATURE GRANULOCYTE % (test code = IG%) 2.2 %           0.0-5.0

          

 

                LYMPHOCYTE % (test code = LY%) 11.9 %          25.0-55.0        

 

                MONOCYTE % (test code = MO%) 6.5 %           0.0-10.0         

 

                EOSINOPHIL % (test code = EO%) 1.5 %           0.0-5.0          

 

                BASOPHIL % (test code = BA%) 0.4 %           0.0-1.0          

 

                NUCLEATED RBC % (test code = NRBC%) 0.0 %           0-0         

     

 

                NEUTROPHIL # (test code = NT#) 12.73 K/mm3     1.8-7.7          

 

                IMMATURE GRANULOCYTE # (test code = IG#) 0.36 x10 3/uL   0-0.03 

          

 

                LYMPHOCYTE # (test code = LY#) 1.96 K/mm3      1.0-5.0          

 

                MONOCYTE # (test code = MO#) 1.07 K/mm3      0-0.8            

 

                EOSINOPHIL # (test code = EO#) 0.25 K/mm3      0.0-0.5          

 

                BASOPHIL # (test code = BA#) 0.07 K/mm3      0.0-0.2          

 

                NUCLEATED RBC # (test code = NRBC#) 0.00 K/mm3      0.0-0.1     

     

 

                MANUAL DIFF REQUIRED (test code = MDIFF) NO, ONLY SCAN NEEDED   

               





DIFFERENTIAL SCAN2019-07-15 05:47:00* 



                Test Item       Value           Reference Range Comments

 

                STAIN ACCEPTABILITY (test code = STN ACCEPTABLE)                

                  

 

                MORPHOLOGY COMMENT (test code = MOC)                            

      

 

                PLATELET ESTIMATE (test code = PLTEST)                          

        

 

                PLATELET MORPHOLOGY (test code = PLTMORPH)                      

            





CBC W/AUTO [id number] 05:47:00* 



                Test Item       Value           Reference Range Comments

 

                WHITE BLOOD CELL (test code = WBC) 16.4 K/mm3      4.5-12.5     

    

 

                RED BLOOD CELL (test code = RBC) 4.56 mill/mm3   4.0-5.8        

  

 

                HEMOGLOBIN (test code = HGB) 12.3 gram/dL    13.0-17.5        

 

                HEMATOCRIT (test code = HCT) 39.7 %          42.0-52.0        

 

                MEAN CELL VOLUME (test code = MCV) 87.1 fL         80-98        

    

 

                MEAN CELL HGB (test code = MCH) 27.0 picogram   27.0-33.0       

 

 

                MEAN CELL HGB CONCETRATION (test code = MCHC) 31.0 gram/dL    33

.0-36.0        

 

                RED CELL DISTRIBUTION WIDTH (test code = RDW) 21.8 %          11

.6-16.2        

 

                RED CELL DISTRIBUTION WIDTH SD (test code = RDW-SD) 68.5 fL     

    37.0-51.0        

 

                PLATELET COUNT (test code = PLT) 220 K/mm3       150-450        

  

 

                MEAN PLATELET VOLUME (test code = MPV) 9.7 fL          6.7-11.0 

        

 

                NEUTROPHIL % (test code = NT%) 77.5 %          39.0-69.0        

 

                IMMATURE GRANULOCYTE % (test code = IG%) 2.2 %           0.0-5.0

          

 

                LYMPHOCYTE % (test code = LY%) 11.9 %          25.0-55.0        

 

                MONOCYTE % (test code = MO%) 6.5 %           0.0-10.0         

 

                EOSINOPHIL % (test code = EO%) 1.5 %           0.0-5.0          

 

                BASOPHIL % (test code = BA%) 0.4 %           0.0-1.0          

 

                NUCLEATED RBC % (test code = NRBC%) 0.0 %           0-0         

     

 

                NEUTROPHIL # (test code = NT#) 12.73 K/mm3     1.8-7.7          

 

                IMMATURE GRANULOCYTE # (test code = IG#) 0.36 x10 3/uL   0-0.03 

          

 

                LYMPHOCYTE # (test code = LY#) 1.96 K/mm3      1.0-5.0          

 

                MONOCYTE # (test code = MO#) 1.07 K/mm3      0-0.8            

 

                EOSINOPHIL # (test code = EO#) 0.25 K/mm3      0.0-0.5          

 

                BASOPHIL # (test code = BA#) 0.07 K/mm3      0.0-0.2          

 

                NUCLEATED RBC # (test code = NRBC#) 0.00 K/mm3      0.0-0.1     

     

 

                MANUAL DIFF REQUIRED (test code = MDIFF) NO, ONLY SCAN NEEDED   

               





DIFFERENTIAL SCAN2019-07-15 05:47:00* 



                Test Item       Value           Reference Range Comments

 

                STAIN ACCEPTABILITY (test code = STN ACCEPTABLE)                

                  

 

                MORPHOLOGY COMMENT (test code = MOC)                            

      

 

                PLATELET ESTIMATE (test code = PLTEST)                          

        

 

                PLATELET MORPHOLOGY (test code = PLTMORPH)                      

            





CBC W/AUTO [id number] 05:46:00* 



                Test Item       Value           Reference Range Comments

 

                WHITE BLOOD CELL (test code = WBC) 16.4 K/mm3      4.5-12.5     

    

 

                RED BLOOD CELL (test code = RBC) 4.56 mill/mm3   4.0-5.8        

  

 

                HEMOGLOBIN (test code = HGB) 12.3 gram/dL    13.0-17.5        

 

                HEMATOCRIT (test code = HCT) 39.7 %          42.0-52.0        

 

                MEAN CELL VOLUME (test code = MCV) 87.1 fL         80-98        

    

 

                MEAN CELL HGB (test code = MCH) 27.0 picogram   27.0-33.0       

 

 

                MEAN CELL HGB CONCETRATION (test code = MCHC) 31.0 gram/dL    33

.0-36.0        

 

                RED CELL DISTRIBUTION WIDTH (test code = RDW) 21.8 %          11

.6-16.2        

 

                RED CELL DISTRIBUTION WIDTH SD (test code = RDW-SD) 68.5 fL     

    37.0-51.0        

 

                PLATELET COUNT (test code = PLT) 220 K/mm3       150-450        

  

 

                MEAN PLATELET VOLUME (test code = MPV) 9.7 fL          6.7-11.0 

        

 

                NEUTROPHIL % (test code = NT%) 77.5 %          39.0-69.0        

 

                IMMATURE GRANULOCYTE % (test code = IG%) 2.2 %           0.0-5.0

          

 

                LYMPHOCYTE % (test code = LY%) 11.9 %          25.0-55.0        

 

                MONOCYTE % (test code = MO%) 6.5 %           0.0-10.0         

 

                EOSINOPHIL % (test code = EO%) 1.5 %           0.0-5.0          

 

                BASOPHIL % (test code = BA%) 0.4 %           0.0-1.0          

 

                NUCLEATED RBC % (test code = NRBC%) 0.0 %           0-0         

     

 

                NEUTROPHIL # (test code = NT#) 12.73 K/mm3     1.8-7.7          

 

                IMMATURE GRANULOCYTE # (test code = IG#) 0.36 x10 3/uL   0-0.03 

          

 

                LYMPHOCYTE # (test code = LY#) 1.96 K/mm3      1.0-5.0          

 

                MONOCYTE # (test code = MO#) 1.07 K/mm3      0-0.8            

 

                EOSINOPHIL # (test code = EO#) 0.25 K/mm3      0.0-0.5          

 

                BASOPHIL # (test code = BA#) 0.07 K/mm3      0.0-0.2          

 

                NUCLEATED RBC # (test code = NRBC#) 0.00 K/mm3      0.0-0.1     

     

 

                MANUAL DIFF REQUIRED (test code = MDIFF) NO, ONLY SCAN NEEDED   

               





DIFFERENTIAL [id number] 05:46:00* 



                Test Item       Value           Reference Range Comments

 

                STAIN ACCEPTABILITY (test code = STN ACCEPTABLE)                

                  

 

                CABOT RINGS (test code = CAB)                                  

 

                MORPHOLOGY COMMENT (test code = MOC)                            

      

 

                PLATELET ESTIMATE (test code = PLTEST)                          

        

 

                PLATELET MORPHOLOGY (test code = PLTMORPH)                      

            





CBC W/AUTO [id number] 05:35:00* 



                Test Item       Value           Reference Range Comments

 

                WHITE BLOOD CELL (test code = WBC) 16.8 K/mm3      4.5-12.5     

    

 

                RED BLOOD CELL (test code = RBC) 4.62 mill/mm3   4.0-5.8        

  

 

                HEMOGLOBIN (test code = HGB) 12.4 gram/dL    13.0-17.5        

 

                HEMATOCRIT (test code = HCT) 39.9 %          42.0-52.0        

 

                MEAN CELL VOLUME (test code = MCV) 86.4 fL         80-98        

    

 

                MEAN CELL HGB (test code = MCH) 26.8 picogram   27.0-33.0       

 

 

                MEAN CELL HGB CONCETRATION (test code = MCHC) 31.1 gram/dL    33

.0-36.0        

 

                RED CELL DISTRIBUTION WIDTH (test code = RDW) 22.1 %          11

.6-16.2        

 

                RED CELL DISTRIBUTION WIDTH SD (test code = RDW-SD) 68.3 fL     

    37.0-51.0        

 

                PLATELET COUNT (test code = PLT) 213 K/mm3       150-450        

  

 

                MEAN PLATELET VOLUME (test code = MPV) 9.6 fL          6.7-11.0 

        

 

                NEUTROPHIL % (test code = NT%) 79.8 %          39.0-69.0        

 

                IMMATURE GRANULOCYTE % (test code = IG%) 1.6 %           0.0-5.0

          

 

                LYMPHOCYTE % (test code = LY%) 10.0 %          25.0-55.0        

 

                MONOCYTE % (test code = MO%) 6.6 %           0.0-10.0         

 

                EOSINOPHIL % (test code = EO%) 1.7 %           0.0-5.0          

 

                BASOPHIL % (test code = BA%) 0.3 %           0.0-1.0          

 

                NUCLEATED RBC % (test code = NRBC%) 0.0 %           0-0         

     

 

                NEUTROPHIL # (test code = NT#) 13.41 K/mm3     1.8-7.7          

 

                IMMATURE GRANULOCYTE # (test code = IG#) 0.27 x10 3/uL   0-0.03 

          

 

                LYMPHOCYTE # (test code = LY#) 1.68 K/mm3      1.0-5.0          

 

                MONOCYTE # (test code = MO#) 1.10 K/mm3      0-0.8            

 

                EOSINOPHIL # (test code = EO#) 0.28 K/mm3      0.0-0.5          

 

                BASOPHIL # (test code = BA#) 0.05 K/mm3      0.0-0.2          

 

                NUCLEATED RBC # (test code = NRBC#) 0.00 K/mm3      0.0-0.1     

     

 

                MANUAL DIFF REQUIRED (test code = MDIFF) NO, ONLY SCAN NEEDED   

               





DIFFERENTIAL [id number] 05:35:00* 



                Test Item       Value           Reference Range Comments

 

                STAIN ACCEPTABILITY (test code = STN ACCEPTABLE) STAIN ACCEPTABL

E                  

 

                POLYCHROMASIA (test code = POLC) 1+                             

  

 

                POIKILOCYTOSIS (test code = POIK) 3+                            

   

 

                ELLIPTOCYTES (test code = ELL) 1+                               

 

                MIGDALIA CELLS (test code = MIGDALIA) 1+              NONE             

 

                SCHISTOCYTES (test code = MISTI) 2+                               

 

                PLATELET ESTIMATE (test code = PLTEST) ADEQUATE                 

        

 

                PLATELET MORPHOLOGY (test code = PLTMORPH) NORMAL               

            





BASIC METABOLIC [id number] 05:34:00* 



                Test Item       Value           Reference Range Comments

 

                SODIUM (test code = NA) 139 mmol/L      136-145          

 

                POTASSIUM (test code = K) 4.4 mmol/L      3.5-5.1          

 

                CHLORIDE (test code = CL) 108.0 mmol/L               

 

                CARBON DIOXIDE (test code = CO2) 25.0 mmol/L     21-32          

  

 

                ANION GAP (test code = GAP) 10.4            10-20            

 

                GLUCOSE (test code = GLU) 81 mg/dL                   

 

                BLOOD UREA NITROGEN (test code = BUN) 17 mg/dL        7-18      

       

 

                GLOMERULAR FILTRATION RATE (test code = GFR) 49 mL/min       >=6

0            Estimated GFR by 

using Modified MDRD formula.Chronic kidney disease is defined as either kidney 
damageor GFR <60 mL/min/1.73 m2 for >3 months.

 

                CREATININE (test code = CREAT) 1.40 mg/dL      0.7-1.3          

 

                BUN/CREATININE RATIO (test code = BUN/CREA) 12.1            10-2

0            

 

                CALCIUM (test code = CA) 8.7 mg/dL       8.5-10.1         





CBC W/AUTO [id number] 05:00:00* 



                Test Item       Value           Reference Range Comments

 

                WHITE BLOOD CELL (test code = WBC) 16.8 K/mm3      4.5-12.5     

    

 

                RED BLOOD CELL (test code = RBC) 4.62 mill/mm3   4.0-5.8        

  

 

                HEMOGLOBIN (test code = HGB) 12.4 gram/dL    13.0-17.5        

 

                HEMATOCRIT (test code = HCT) 39.9 %          42.0-52.0        

 

                MEAN CELL VOLUME (test code = MCV) 86.4 fL         80-98        

    

 

                MEAN CELL HGB (test code = MCH) 26.8 picogram   27.0-33.0       

 

 

                MEAN CELL HGB CONCETRATION (test code = MCHC) 31.1 gram/dL    33

.0-36.0        

 

                RED CELL DISTRIBUTION WIDTH (test code = RDW) 22.1 %          11

.6-16.2        

 

                RED CELL DISTRIBUTION WIDTH SD (test code = RDW-SD) 68.3 fL     

    37.0-51.0        

 

                PLATELET COUNT (test code = PLT) 213 K/mm3       150-450        

  

 

                MEAN PLATELET VOLUME (test code = MPV) 9.6 fL          6.7-11.0 

        

 

                NEUTROPHIL % (test code = NT%) 79.8 %          39.0-69.0        

 

                IMMATURE GRANULOCYTE % (test code = IG%) 1.6 %           0.0-5.0

          

 

                LYMPHOCYTE % (test code = LY%) 10.0 %          25.0-55.0        

 

                MONOCYTE % (test code = MO%) 6.6 %           0.0-10.0         

 

                EOSINOPHIL % (test code = EO%) 1.7 %           0.0-5.0          

 

                BASOPHIL % (test code = BA%) 0.3 %           0.0-1.0          

 

                NUCLEATED RBC % (test code = NRBC%) 0.0 %           0-0         

     

 

                NEUTROPHIL # (test code = NT#) 13.41 K/mm3     1.8-7.7          

 

                IMMATURE GRANULOCYTE # (test code = IG#) 0.27 x10 3/uL   0-0.03 

          

 

                LYMPHOCYTE # (test code = LY#) 1.68 K/mm3      1.0-5.0          

 

                MONOCYTE # (test code = MO#) 1.10 K/mm3      0-0.8            

 

                EOSINOPHIL # (test code = EO#) 0.28 K/mm3      0.0-0.5          

 

                BASOPHIL # (test code = BA#) 0.05 K/mm3      0.0-0.2          

 

                NUCLEATED RBC # (test code = NRBC#) 0.00 K/mm3      0.0-0.1     

     

 

                MANUAL DIFF REQUIRED (test code = MDIFF) NO, ONLY SCAN NEEDED   

               





DIFFERENTIAL [id number] 05:00:00* 



                Test Item       Value           Reference Range Comments

 

                STAIN ACCEPTABILITY (test code = STN ACCEPTABLE)                

                  

 

                CABOT RINGS (test code = CAB)                                  

 

                MORPHOLOGY COMMENT (test code = MOC)                            

      

 

                PLATELET ESTIMATE (test code = PLTEST)                          

        

 

                PLATELET MORPHOLOGY (test code = PLTMORPH)                      

            





CBC W/AUTO [id number] 05:00:00* 



                Test Item       Value           Reference Range Comments

 

                WHITE BLOOD CELL (test code = WBC) 16.8 K/mm3      4.5-12.5     

    

 

                RED BLOOD CELL (test code = RBC) 4.62 mill/mm3   4.0-5.8        

  

 

                HEMOGLOBIN (test code = HGB) 12.4 gram/dL    13.0-17.5        

 

                HEMATOCRIT (test code = HCT) 39.9 %          42.0-52.0        

 

                MEAN CELL VOLUME (test code = MCV) 86.4 fL         80-98        

    

 

                MEAN CELL HGB (test code = MCH) 26.8 picogram   27.0-33.0       

 

 

                MEAN CELL HGB CONCETRATION (test code = MCHC) 31.1 gram/dL    33

.0-36.0        

 

                RED CELL DISTRIBUTION WIDTH (test code = RDW) 22.1 %          11

.6-16.2        

 

                RED CELL DISTRIBUTION WIDTH SD (test code = RDW-SD) 68.3 fL     

    37.0-51.0        

 

                PLATELET COUNT (test code = PLT) 213 K/mm3       150-450        

  

 

                MEAN PLATELET VOLUME (test code = MPV) 9.6 fL          6.7-11.0 

        

 

                NEUTROPHIL % (test code = NT%) 79.8 %          39.0-69.0        

 

                IMMATURE GRANULOCYTE % (test code = IG%) 1.6 %           0.0-5.0

          

 

                LYMPHOCYTE % (test code = LY%) 10.0 %          25.0-55.0        

 

                MONOCYTE % (test code = MO%) 6.6 %           0.0-10.0         

 

                EOSINOPHIL % (test code = EO%) 1.7 %           0.0-5.0          

 

                BASOPHIL % (test code = BA%) 0.3 %           0.0-1.0          

 

                NUCLEATED RBC % (test code = NRBC%) 0.0 %           0-0         

     

 

                NEUTROPHIL # (test code = NT#) 13.41 K/mm3     1.8-7.7          

 

                IMMATURE GRANULOCYTE # (test code = IG#) 0.27 x10 3/uL   0-0.03 

          

 

                LYMPHOCYTE # (test code = LY#) 1.68 K/mm3      1.0-5.0          

 

                MONOCYTE # (test code = MO#) 1.10 K/mm3      0-0.8            

 

                EOSINOPHIL # (test code = EO#) 0.28 K/mm3      0.0-0.5          

 

                BASOPHIL # (test code = BA#) 0.05 K/mm3      0.0-0.2          

 

                NUCLEATED RBC # (test code = NRBC#) 0.00 K/mm3      0.0-0.1     

     

 

                MANUAL DIFF REQUIRED (test code = MDIFF) NO, ONLY SCAN NEEDED   

               





DIFFERENTIAL [id number] 05:00:00* 



                Test Item       Value           Reference Range Comments

 

                STAIN ACCEPTABILITY (test code = STN ACCEPTABLE)                

                  

 

                CABOT RINGS (test code = CAB)                                  

 

                MORPHOLOGY COMMENT (test code = MOC)                            

      

 

                PLATELET ESTIMATE (test code = PLTEST)                          

        

 

                PLATELET MORPHOLOGY (test code = PLTMORPH)                      

            





CBC W/AUTO [id number] 05:00:00* 



                Test Item       Value           Reference Range Comments

 

                WHITE BLOOD CELL (test code = WBC) 16.8 K/mm3      4.5-12.5     

    

 

                RED BLOOD CELL (test code = RBC) 4.62 mill/mm3   4.0-5.8        

  

 

                HEMOGLOBIN (test code = HGB) 12.4 gram/dL    13.0-17.5        

 

                HEMATOCRIT (test code = HCT) 39.9 %          42.0-52.0        

 

                MEAN CELL VOLUME (test code = MCV) 86.4 fL         80-98        

    

 

                MEAN CELL HGB (test code = MCH) 26.8 picogram   27.0-33.0       

 

 

                MEAN CELL HGB CONCETRATION (test code = MCHC) 31.1 gram/dL    33

.0-36.0        

 

                RED CELL DISTRIBUTION WIDTH (test code = RDW) 22.1 %          11

.6-16.2        

 

                RED CELL DISTRIBUTION WIDTH SD (test code = RDW-SD) 68.3 fL     

    37.0-51.0        

 

                PLATELET COUNT (test code = PLT) 213 K/mm3       150-450        

  

 

                MEAN PLATELET VOLUME (test code = MPV) 9.6 fL          6.7-11.0 

        

 

                NEUTROPHIL % (test code = NT%) 79.8 %          39.0-69.0        

 

                IMMATURE GRANULOCYTE % (test code = IG%) 1.6 %           0.0-5.0

          

 

                LYMPHOCYTE % (test code = LY%) 10.0 %          25.0-55.0        

 

                MONOCYTE % (test code = MO%) 6.6 %           0.0-10.0         

 

                EOSINOPHIL % (test code = EO%) 1.7 %           0.0-5.0          

 

                BASOPHIL % (test code = BA%) 0.3 %           0.0-1.0          

 

                NUCLEATED RBC % (test code = NRBC%) 0.0 %           0-0         

     

 

                NEUTROPHIL # (test code = NT#) 13.41 K/mm3     1.8-7.7          

 

                IMMATURE GRANULOCYTE # (test code = IG#) 0.27 x10 3/uL   0-0.03 

          

 

                LYMPHOCYTE # (test code = LY#) 1.68 K/mm3      1.0-5.0          

 

                MONOCYTE # (test code = MO#) 1.10 K/mm3      0-0.8            

 

                EOSINOPHIL # (test code = EO#) 0.28 K/mm3      0.0-0.5          

 

                BASOPHIL # (test code = BA#) 0.05 K/mm3      0.0-0.2          

 

                NUCLEATED RBC # (test code = NRBC#) 0.00 K/mm3      0.0-0.1     

     

 

                MANUAL DIFF REQUIRED (test code = MDIFF) NO, ONLY SCAN NEEDED   

               





DIFFERENTIAL [id number] 05:00:00* 



                Test Item       Value           Reference Range Comments

 

                STAIN ACCEPTABILITY (test code = STN ACCEPTABLE)                

                  

 

                MORPHOLOGY COMMENT (test code = MOC)                            

      

 

                PLATELET ESTIMATE (test code = PLTEST)                          

        

 

                PLATELET MORPHOLOGY (test code = PLTMORPH)                      

            





CBC W/AUTO [id number] 05:00:00* 



                Test Item       Value           Reference Range Comments

 

                WHITE BLOOD CELL (test code = WBC) 16.8 K/mm3      4.5-12.5     

    

 

                RED BLOOD CELL (test code = RBC) 4.62 mill/mm3   4.0-5.8        

  

 

                HEMOGLOBIN (test code = HGB) 12.4 gram/dL    13.0-17.5        

 

                HEMATOCRIT (test code = HCT) 39.9 %          42.0-52.0        

 

                MEAN CELL VOLUME (test code = MCV) 86.4 fL         80-98        

    

 

                MEAN CELL HGB (test code = MCH) 26.8 picogram   27.0-33.0       

 

 

                MEAN CELL HGB CONCETRATION (test code = MCHC) 31.1 gram/dL    33

.0-36.0        

 

                RED CELL DISTRIBUTION WIDTH (test code = RDW) 22.1 %          11

.6-16.2        

 

                RED CELL DISTRIBUTION WIDTH SD (test code = RDW-SD) 68.3 fL     

    37.0-51.0        

 

                PLATELET COUNT (test code = PLT) 213 K/mm3       150-450        

  

 

                MEAN PLATELET VOLUME (test code = MPV) 9.6 fL          6.7-11.0 

        

 

                NEUTROPHIL % (test code = NT%) 79.8 %          39.0-69.0        

 

                IMMATURE GRANULOCYTE % (test code = IG%) 1.6 %           0.0-5.0

          

 

                LYMPHOCYTE % (test code = LY%) 10.0 %          25.0-55.0        

 

                MONOCYTE % (test code = MO%) 6.6 %           0.0-10.0         

 

                EOSINOPHIL % (test code = EO%) 1.7 %           0.0-5.0          

 

                BASOPHIL % (test code = BA%) 0.3 %           0.0-1.0          

 

                NUCLEATED RBC % (test code = NRBC%) 0.0 %           0-0         

     

 

                NEUTROPHIL # (test code = NT#) 13.41 K/mm3     1.8-7.7          

 

                IMMATURE GRANULOCYTE # (test code = IG#) 0.27 x10 3/uL   0-0.03 

          

 

                LYMPHOCYTE # (test code = LY#) 1.68 K/mm3      1.0-5.0          

 

                MONOCYTE # (test code = MO#) 1.10 K/mm3      0-0.8            

 

                EOSINOPHIL # (test code = EO#) 0.28 K/mm3      0.0-0.5          

 

                BASOPHIL # (test code = BA#) 0.05 K/mm3      0.0-0.2          

 

                NUCLEATED RBC # (test code = NRBC#) 0.00 K/mm3      0.0-0.1     

     

 

                MANUAL DIFF REQUIRED (test code = MDIFF) NO, ONLY SCAN NEEDED   

               





DIFFERENTIAL [id number] 05:00:00* 



                Test Item       Value           Reference Range Comments

 

                STAIN ACCEPTABILITY (test code = STN ACCEPTABLE)                

                  

 

                CABOT RINGS (test code = CAB)                                  

 

                MORPHOLOGY COMMENT (test code = MOC)                            

      

 

                PLATELET ESTIMATE (test code = PLTEST)                          

        

 

                PLATELET MORPHOLOGY (test code = PLTMORPH)                      

            





CBC W/AUTO [id number] 06:18:00* 



                Test Item       Value           Reference Range Comments

 

                WHITE BLOOD CELL (test code = WBC) 17.3 K/mm3      4.5-12.5     

    

 

                RED BLOOD CELL (test code = RBC) 4.51 mill/mm3   4.0-5.8        

  

 

                HEMOGLOBIN (test code = HGB) 11.9 gram/dL    13.0-17.5        

 

                HEMATOCRIT (test code = HCT) 37.7 %          42.0-52.0        

 

                MEAN CELL VOLUME (test code = MCV) 83.6 fL         80-98        

    

 

                MEAN CELL HGB (test code = MCH) 26.4 picogram   27.0-33.0       

 

 

                MEAN CELL HGB CONCETRATION (test code = MCHC) 31.6 gram/dL    33

.0-36.0        

 

                RED CELL DISTRIBUTION WIDTH (test code = RDW) 22.2 %          11

.6-16.2        

 

                RED CELL DISTRIBUTION WIDTH SD (test code = RDW-SD) 65.8 fL     

    37.0-51.0        

 

                PLATELET COUNT (test code = PLT) 216 K/mm3       150-450        

  

 

                MEAN PLATELET VOLUME (test code = MPV) 10.2 fL         6.7-11.0 

        

 

                NEUTROPHIL % (test code = NT%) 82.2 %          39.0-69.0        

 

                IMMATURE GRANULOCYTE % (test code = IG%) 1.3 %           0.0-5.0

          

 

                LYMPHOCYTE % (test code = LY%) 10.4 %          25.0-55.0        

 

                MONOCYTE % (test code = MO%) 5.0 %           0.0-10.0         

 

                EOSINOPHIL % (test code = EO%) 0.9 %           0.0-5.0          

 

                BASOPHIL % (test code = BA%) 0.2 %           0.0-1.0          

 

                NUCLEATED RBC % (test code = NRBC%) 0.0 %           0-0         

     

 

                NEUTROPHIL # (test code = NT#) 14.19 K/mm3     1.8-7.7          

 

                IMMATURE GRANULOCYTE # (test code = IG#) 0.22 x10 3/uL   0-0.03 

          

 

                LYMPHOCYTE # (test code = LY#) 1.79 K/mm3      1.0-5.0          

 

                MONOCYTE # (test code = MO#) 0.87 K/mm3      0-0.8            

 

                EOSINOPHIL # (test code = EO#) 0.16 K/mm3      0.0-0.5          

 

                BASOPHIL # (test code = BA#) 0.04 K/mm3      0.0-0.2          

 

                NUCLEATED RBC # (test code = NRBC#) 0.00 K/mm3      0.0-0.1     

     

 

                MANUAL DIFF REQUIRED (test code = MDIFF) NO, ONLY SCAN NEEDED   

               





DIFFERENTIAL [id number] 06:18:00* 



                Test Item       Value           Reference Range Comments

 

                STAIN ACCEPTABILITY (test code = STN ACCEPTABLE) STAIN ACCEPTABL

E                  

 

                POIKILOCYTOSIS (test code = POIK) 3+                            

   

 

                ELLIPTOCYTES (test code = ELL) 1+                               

 

                SCHISTOCYTES (test code = MISTI) 2+                               

 

                PLATELET ESTIMATE (test code = PLTEST) ADEQUATE                 

        

 

                PLATELET MORPHOLOGY (test code = PLTMORPH) NORMAL               

            





COMPREHENSIVE METABOLIC [id number] 06:14:00* 



                Test Item       Value           Reference Range Comments

 

                SODIUM (test code = NA) 141 mmol/L      136-145          

 

                POTASSIUM (test code = K) 4.3 mmol/L      3.5-5.1          

 

                CHLORIDE (test code = CL) 111.0 mmol/L               

 

                CARBON DIOXIDE (test code = CO2) 22.0 mmol/L     21-32          

  

 

                ANION GAP (test code = GAP) 12.3            10-20            

 

                GLUCOSE (test code = GLU) 90 mg/dL                   

 

                BLOOD UREA NITROGEN (test code = BUN) 20 mg/dL        7-18      

       

 

                GLOMERULAR FILTRATION RATE (test code = GFR) 54 mL/min       >=6

0            Estimated GFR by 

using Modified MDRD formula.Chronic kidney disease is defined as either kidney 
damageor GFR <60 mL/min/1.73 m2 for >3 months.

 

                CREATININE (test code = CREAT) 1.30 mg/dL      0.7-1.3          

 

                BUN/CREATININE RATIO (test code = BUN/CREA) 15.4            10-2

0            

 

                TOTAL PROTEIN (test code = PROT) 6.4 gram/dL     6.4-8.2        

  

 

                ALBUMIN (test code = ALB) 2.5 g/dL        3.4-5.0          

 

                GLOBULIN (test code = GLOB) 3.9 gram/dL     2.7-4.2          

 

                ALBUMIN/GLOBULIN RATIO (test code = A/G) 0.6             0.75-1.

50        

 

                CALCIUM (test code = CA) 8.9 mg/dL       8.5-10.1         

 

                BILIRUBIN TOTAL (test code = BILT) 0.80 mg/dL      0.0-1.0      

    

 

                SGOT/AST (test code = AST) 10 IUnit/L      15-37            

 

                SGPT/ALT (test code = ALT) 13 IUnit/L      12-78            

 

                ALKALINE PHOSPHATASE TOTAL (test code = ALKP) 91 IUnit/L      45

-117          **Note change in

reference range due to change in reagent.**





COMPREHENSIVE METABOLIC [id number] 06:03:00* 



                Test Item       Value           Reference Range Comments

 

                SODIUM (test code = NA) 141 mmol/L      136-145          

 

                POTASSIUM (test code = K) 4.3 mmol/L      3.5-5.1          

 

                CHLORIDE (test code = CL) 111.0 mmol/L               

 

                CARBON DIOXIDE (test code = CO2)  mmol/L         21-32          

  

 

                ANION GAP (test code = GAP)                 10-20            

 

                GLUCOSE (test code = GLU)  mg/dL                     

 

                BLOOD UREA NITROGEN (test code = BUN)  mg/dL          7-18      

       

 

                GLOMERULAR FILTRATION RATE (test code = GFR)  mL/min         >=6

0             

 

                CREATININE (test code = CREAT)  mg/dL          0.7-1.3          

 

                BUN/CREATININE RATIO (test code = BUN/CREA)                 10-2

0            

 

                TOTAL PROTEIN (test code = PROT)  gram/dL        6.4-8.2        

  

 

                ALBUMIN (test code = ALB)  g/dL           3.4-5.0          

 

                GLOBULIN (test code = GLOB)  gram/dL        2.7-4.2          

 

                ALBUMIN/GLOBULIN RATIO (test code = A/G)                 0.75-1.

50        

 

                CALCIUM (test code = CA)  mg/dL          8.5-10.1         

 

                BILIRUBIN TOTAL (test code = BILT)  mg/dL          0.0-1.0      

    

 

                SGOT/AST (test code = AST)  IUnit/L        15-37            

 

                SGPT/ALT (test code = ALT)  IUnit/L        12-78            

 

                ALKALINE PHOSPHATASE TOTAL (test code = ALKP)  IUnit/L        45

-117           





CBC W/AUTO [id number] 05:53:00* 



                Test Item       Value           Reference Range Comments

 

                WHITE BLOOD CELL (test code = WBC) 17.3 K/mm3      4.5-12.5     

    

 

                RED BLOOD CELL (test code = RBC) 4.51 mill/mm3   4.0-5.8        

  

 

                HEMOGLOBIN (test code = HGB) 11.9 gram/dL    13.0-17.5        

 

                HEMATOCRIT (test code = HCT) 37.7 %          42.0-52.0        

 

                MEAN CELL VOLUME (test code = MCV) 83.6 fL         80-98        

    

 

                MEAN CELL HGB (test code = MCH) 26.4 picogram   27.0-33.0       

 

 

                MEAN CELL HGB CONCETRATION (test code = MCHC) 31.6 gram/dL    33

.0-36.0        

 

                RED CELL DISTRIBUTION WIDTH (test code = RDW) 22.2 %          11

.6-16.2        

 

                RED CELL DISTRIBUTION WIDTH SD (test code = RDW-SD) 65.8 fL     

    37.0-51.0        

 

                PLATELET COUNT (test code = PLT) 216 K/mm3       150-450        

  

 

                MEAN PLATELET VOLUME (test code = MPV) 10.2 fL         6.7-11.0 

        

 

                NEUTROPHIL % (test code = NT%) 82.2 %          39.0-69.0        

 

                IMMATURE GRANULOCYTE % (test code = IG%) 1.3 %           0.0-5.0

          

 

                LYMPHOCYTE % (test code = LY%) 10.4 %          25.0-55.0        

 

                MONOCYTE % (test code = MO%) 5.0 %           0.0-10.0         

 

                EOSINOPHIL % (test code = EO%) 0.9 %           0.0-5.0          

 

                BASOPHIL % (test code = BA%) 0.2 %           0.0-1.0          

 

                NUCLEATED RBC % (test code = NRBC%) 0.0 %           0-0         

     

 

                NEUTROPHIL # (test code = NT#) 14.19 K/mm3     1.8-7.7          

 

                IMMATURE GRANULOCYTE # (test code = IG#) 0.22 x10 3/uL   0-0.03 

          

 

                LYMPHOCYTE # (test code = LY#) 1.79 K/mm3      1.0-5.0          

 

                MONOCYTE # (test code = MO#) 0.87 K/mm3      0-0.8            

 

                EOSINOPHIL # (test code = EO#) 0.16 K/mm3      0.0-0.5          

 

                BASOPHIL # (test code = BA#) 0.04 K/mm3      0.0-0.2          

 

                NUCLEATED RBC # (test code = NRBC#) 0.00 K/mm3      0.0-0.1     

     

 

                MANUAL DIFF REQUIRED (test code = MDIFF) NO, ONLY SCAN NEEDED   

               





DIFFERENTIAL [id number] 05:53:00* 



                Test Item       Value           Reference Range Comments

 

                STAIN ACCEPTABILITY (test code = STN ACCEPTABLE)                

                  

 

                CABOT RINGS (test code = CAB)                                  

 

                MORPHOLOGY COMMENT (test code = MOC)                            

      

 

                PLATELET ESTIMATE (test code = PLTEST)                          

        

 

                PLATELET MORPHOLOGY (test code = PLTMORPH)                      

            





CBC W/AUTO [id number] 05:53:00* 



                Test Item       Value           Reference Range Comments

 

                WHITE BLOOD CELL (test code = WBC) 17.3 K/mm3      4.5-12.5     

    

 

                RED BLOOD CELL (test code = RBC) 4.51 mill/mm3   4.0-5.8        

  

 

                HEMOGLOBIN (test code = HGB) 11.9 gram/dL    13.0-17.5        

 

                HEMATOCRIT (test code = HCT) 37.7 %          42.0-52.0        

 

                MEAN CELL VOLUME (test code = MCV) 83.6 fL         80-98        

    

 

                MEAN CELL HGB (test code = MCH) 26.4 picogram   27.0-33.0       

 

 

                MEAN CELL HGB CONCETRATION (test code = MCHC) 31.6 gram/dL    33

.0-36.0        

 

                RED CELL DISTRIBUTION WIDTH (test code = RDW) 22.2 %          11

.6-16.2        

 

                RED CELL DISTRIBUTION WIDTH SD (test code = RDW-SD) 65.8 fL     

    37.0-51.0        

 

                PLATELET COUNT (test code = PLT) 216 K/mm3       150-450        

  

 

                MEAN PLATELET VOLUME (test code = MPV) 10.2 fL         6.7-11.0 

        

 

                NEUTROPHIL % (test code = NT%) 82.2 %          39.0-69.0        

 

                IMMATURE GRANULOCYTE % (test code = IG%) 1.3 %           0.0-5.0

          

 

                LYMPHOCYTE % (test code = LY%) 10.4 %          25.0-55.0        

 

                MONOCYTE % (test code = MO%) 5.0 %           0.0-10.0         

 

                EOSINOPHIL % (test code = EO%) 0.9 %           0.0-5.0          

 

                BASOPHIL % (test code = BA%) 0.2 %           0.0-1.0          

 

                NUCLEATED RBC % (test code = NRBC%) 0.0 %           0-0         

     

 

                NEUTROPHIL # (test code = NT#) 14.19 K/mm3     1.8-7.7          

 

                IMMATURE GRANULOCYTE # (test code = IG#) 0.22 x10 3/uL   0-0.03 

          

 

                LYMPHOCYTE # (test code = LY#) 1.79 K/mm3      1.0-5.0          

 

                MONOCYTE # (test code = MO#) 0.87 K/mm3      0-0.8            

 

                EOSINOPHIL # (test code = EO#) 0.16 K/mm3      0.0-0.5          

 

                BASOPHIL # (test code = BA#) 0.04 K/mm3      0.0-0.2          

 

                NUCLEATED RBC # (test code = NRBC#) 0.00 K/mm3      0.0-0.1     

     

 

                MANUAL DIFF REQUIRED (test code = MDIFF) NO, ONLY SCAN NEEDED   

               





DIFFERENTIAL [id number] 05:53:00* 



                Test Item       Value           Reference Range Comments

 

                STAIN ACCEPTABILITY (test code = STN ACCEPTABLE)                

                  

 

                MORPHOLOGY COMMENT (test code = MOC)                            

      

 

                PLATELET ESTIMATE (test code = PLTEST)                          

        

 

                PLATELET MORPHOLOGY (test code = PLTMORPH)                      

            





CBC W/AUTO [id number] 05:52:00* 



                Test Item       Value           Reference Range Comments

 

                WHITE BLOOD CELL (test code = WBC) 17.3 K/mm3      4.5-12.5     

    

 

                RED BLOOD CELL (test code = RBC) 4.51 mill/mm3   4.0-5.8        

  

 

                HEMOGLOBIN (test code = HGB) 11.9 gram/dL    13.0-17.5        

 

                HEMATOCRIT (test code = HCT) 37.7 %          42.0-52.0        

 

                MEAN CELL VOLUME (test code = MCV) 83.6 fL         80-98        

    

 

                MEAN CELL HGB (test code = MCH) 26.4 picogram   27.0-33.0       

 

 

                MEAN CELL HGB CONCETRATION (test code = MCHC) 31.6 gram/dL    33

.0-36.0        

 

                RED CELL DISTRIBUTION WIDTH (test code = RDW) 22.2 %          11

.6-16.2        

 

                RED CELL DISTRIBUTION WIDTH SD (test code = RDW-SD) 65.8 fL     

    37.0-51.0        

 

                PLATELET COUNT (test code = PLT) 216 K/mm3       150-450        

  

 

                MEAN PLATELET VOLUME (test code = MPV) 10.2 fL         6.7-11.0 

        

 

                NEUTROPHIL % (test code = NT%) 82.2 %          39.0-69.0        

 

                IMMATURE GRANULOCYTE % (test code = IG%) 1.3 %           0.0-5.0

          

 

                LYMPHOCYTE % (test code = LY%) 10.4 %          25.0-55.0        

 

                MONOCYTE % (test code = MO%) 5.0 %           0.0-10.0         

 

                EOSINOPHIL % (test code = EO%) 0.9 %           0.0-5.0          

 

                BASOPHIL % (test code = BA%) 0.2 %           0.0-1.0          

 

                NUCLEATED RBC % (test code = NRBC%) 0.0 %           0-0         

     

 

                NEUTROPHIL # (test code = NT#) 14.19 K/mm3     1.8-7.7          

 

                IMMATURE GRANULOCYTE # (test code = IG#) 0.22 x10 3/uL   0-0.03 

          

 

                LYMPHOCYTE # (test code = LY#) 1.79 K/mm3      1.0-5.0          

 

                MONOCYTE # (test code = MO#) 0.87 K/mm3      0-0.8            

 

                EOSINOPHIL # (test code = EO#) 0.16 K/mm3      0.0-0.5          

 

                BASOPHIL # (test code = BA#) 0.04 K/mm3      0.0-0.2          

 

                NUCLEATED RBC # (test code = NRBC#) 0.00 K/mm3      0.0-0.1     

     

 

                MANUAL DIFF REQUIRED (test code = MDIFF) NO, ONLY SCAN NEEDED   

               





DIFFERENTIAL [id number] 05:52:00* 



                Test Item       Value           Reference Range Comments

 

                STAIN ACCEPTABILITY (test code = STN ACCEPTABLE)                

                  

 

                CABOT RINGS (test code = CAB)                                  

 

                MORPHOLOGY COMMENT (test code = MOC)                            

      

 

                PLATELET ESTIMATE (test code = PLTEST)                          

        

 

                PLATELET MORPHOLOGY (test code = PLTMORPH)                      

            





CBC W/AUTO [id number] 05:52:00* 



                Test Item       Value           Reference Range Comments

 

                WHITE BLOOD CELL (test code = WBC) 17.3 K/mm3      4.5-12.5     

    

 

                RED BLOOD CELL (test code = RBC) 4.51 mill/mm3   4.0-5.8        

  

 

                HEMOGLOBIN (test code = HGB) 11.9 gram/dL    13.0-17.5        

 

                HEMATOCRIT (test code = HCT) 37.7 %          42.0-52.0        

 

                MEAN CELL VOLUME (test code = MCV) 83.6 fL         80-98        

    

 

                MEAN CELL HGB (test code = MCH) 26.4 picogram   27.0-33.0       

 

 

                MEAN CELL HGB CONCETRATION (test code = MCHC) 31.6 gram/dL    33

.0-36.0        

 

                RED CELL DISTRIBUTION WIDTH (test code = RDW) 22.2 %          11

.6-16.2        

 

                RED CELL DISTRIBUTION WIDTH SD (test code = RDW-SD) 65.8 fL     

    37.0-51.0        

 

                PLATELET COUNT (test code = PLT) 216 K/mm3       150-450        

  

 

                MEAN PLATELET VOLUME (test code = MPV) 10.2 fL         6.7-11.0 

        

 

                NEUTROPHIL % (test code = NT%) 82.2 %          39.0-69.0        

 

                IMMATURE GRANULOCYTE % (test code = IG%) 1.3 %           0.0-5.0

          

 

                LYMPHOCYTE % (test code = LY%) 10.4 %          25.0-55.0        

 

                MONOCYTE % (test code = MO%) 5.0 %           0.0-10.0         

 

                EOSINOPHIL % (test code = EO%) 0.9 %           0.0-5.0          

 

                BASOPHIL % (test code = BA%) 0.2 %           0.0-1.0          

 

                NUCLEATED RBC % (test code = NRBC%) 0.0 %           0-0         

     

 

                NEUTROPHIL # (test code = NT#) 14.19 K/mm3     1.8-7.7          

 

                IMMATURE GRANULOCYTE # (test code = IG#) 0.22 x10 3/uL   0-0.03 

          

 

                LYMPHOCYTE # (test code = LY#) 1.79 K/mm3      1.0-5.0          

 

                MONOCYTE # (test code = MO#) 0.87 K/mm3      0-0.8            

 

                EOSINOPHIL # (test code = EO#) 0.16 K/mm3      0.0-0.5          

 

                BASOPHIL # (test code = BA#) 0.04 K/mm3      0.0-0.2          

 

                NUCLEATED RBC # (test code = NRBC#) 0.00 K/mm3      0.0-0.1     

     

 

                MANUAL DIFF REQUIRED (test code = MDIFF) NO, ONLY SCAN NEEDED   

               





DIFFERENTIAL [id number] 05:52:00* 



                Test Item       Value           Reference Range Comments

 

                STAIN ACCEPTABILITY (test code = STN ACCEPTABLE)                

                  

 

                CABOT RINGS (test code = CAB)                                  

 

                MORPHOLOGY COMMENT (test code = MOC)                            

      

 

                PLATELET ESTIMATE (test code = PLTEST)                          

        

 

                PLATELET MORPHOLOGY (test code = PLTMORPH)                      

            





LACTIC [id number] 19:19:00* 



                Test Item       Value           Reference Range Comments

 

                LACTIC ACID (test code = LACT) 1.5 mmol/L      0.4-1.9          





DELAYED DUE TO PHLEB IN ER CODE V.LAB.SC 19 1855URINALYSIS COMPLETE
2019 19:06:00* 



                Test Item       Value           Reference Range Comments

 

                UA COLOR (test code = COLU) Light-Yellow    YELLOW           

 

                UA APPEARANCE (test code = APPU) CLEAR           CLEAR          

  

 

                UA GLUCOSE DIPSTICK (test code = DGLUU) NEGATIVE mg/dL  NEGATIVE

         

 

                UA BILIRUBIN DIPSTICK (test code = BILU) NEGATIVE mg/dL  NEGATIV

E         

 

                UA KETONE DIPSTICK (test code = KETU) NEGATIVE mg/dL  NEGATIVE  

       

 

                UA SPECIFIC GRAVITY (test code = SGU) 1.012           1.001-1.03

5      

 

                UA BLOOD DIPSTICK (test code = SHEELA) Negative mg/dL  NEGATIVE    

     

 

                UA PH DIPSTICK (test code = ISMAEL) 5.0             5.0-8.0        

  

 

                UA PROTEIN DIPSTICK (test code = PROU) NEGATIVE mg/dL  NEGATIVE 

        

 

                UA UROBILINIOGEN DIPSTICK (test code = URO) Normal mg/dL    NEGA

TIVE         

 

                UA NITRITE DIPSTICK (test code = DYLON) NEGATIVE        NEGATIVE 

        

 

                    UA LEUKOCYTE ESTERASE W REFLEX (test code = LEUUR) 75 Jeremie/uL

 (1+) Jeremie/uL 

NEGATIVE                                 

 

                UA WBC (test code = WBCU) 6-10 per HPF    0-5              

 

                UA RBC (test code = RBCU) 0-2 #/HPF       0-5              

 

                UA EPITHELIAL CELLS (test code = EPIU) Few (2-5/hpf) per HPF Few

              

 

                UA BACTERIA (test code = BACU) FEW #/HPF       NONE             

 

                UA HYALINE CAST (test code = HYALU) 0-2 #/LPF       0-5         

     

 

                UA MUCUS (test code = MUCU) FEW #/LPF       FEW              

 

                UA YEAST (test code = YEASTU) FEW #/HPF       NONE             





Urine Source? Clean CatchURINALYSIS [id number] 18:56:00* 



                Test Item       Value           Reference Range Comments

 

                UA COLOR (test code = COLU) Light-Yellow    YELLOW           

 

                UA APPEARANCE (test code = APPU) CLEAR           CLEAR          

  

 

                UA GLUCOSE DIPSTICK (test code = DGLUU) NEGATIVE mg/dL  NEGATIVE

         

 

                UA BILIRUBIN DIPSTICK (test code = BILU) NEGATIVE mg/dL  NEGATIV

E         

 

                UA KETONE DIPSTICK (test code = KETU) NEGATIVE mg/dL  NEGATIVE  

       

 

                UA SPECIFIC GRAVITY (test code = SGU) 1.012           1.001-1.03

5      

 

                UA BLOOD DIPSTICK (test code = SHELEA) Negative mg/dL  NEGATIVE    

     

 

                UA PH DIPSTICK (test code = ISMAEL) 5.0             5.0-8.0        

  

 

                UA PROTEIN DIPSTICK (test code = PROU) NEGATIVE mg/dL  NEGATIVE 

        

 

                UA UROBILINIOGEN DIPSTICK (test code = URO) Normal mg/dL    NEGA

TIVE         

 

                UA NITRITE DIPSTICK (test code = DYLON) NEGATIVE        NEGATIVE 

        

 

                    UA LEUKOCYTE ESTERASE W REFLEX (test code = LEUUR) 75 Jeremie/uL

 (1+) Jeremie/uL 

NEGATIVE                                 

 

                UA WBC (test code = WBCU) 6-10 per HPF    0-5              

 

                UA RBC (test code = RBCU) 0-2 #/HPF       0-5              

 

                UA EPITHELIAL CELLS (test code = EPIU)  per HPF        Few      

        

 

                UA BACTERIA (test code = BACU) FEW #/HPF       NONE             

 

                UA HYALINE CAST (test code = HYALU) 0-2 #/LPF       0-5         

     

 

                UA MUCUS (test code = MUCU) FEW #/LPF       FEW              

 

                UA YEAST (test code = YEASTU) FEW #/HPF       NONE             





Urine Source? Clean CatchURINALYSIS [id number] 18:27:00* 



                Test Item       Value           Reference Range Comments

 

                UA COLOR (test code = COLU) Light-Yellow    YELLOW           

 

                UA APPEARANCE (test code = APPU) CLEAR           CLEAR          

  

 

                UA GLUCOSE DIPSTICK (test code = DGLUU) NEGATIVE mg/dL  NEGATIVE

         

 

                UA BILIRUBIN DIPSTICK (test code = BILU) NEGATIVE mg/dL  NEGATIV

E         

 

                UA KETONE DIPSTICK (test code = KETU) NEGATIVE mg/dL  NEGATIVE  

       

 

                UA SPECIFIC GRAVITY (test code = SGU) 1.012           1.001-1.03

5      

 

                UA BLOOD DIPSTICK (test code = SHEELA) Negative mg/dL  NEGATIVE    

     

 

                UA PH DIPSTICK (test code = ISMAEL) 5.0             5.0-8.0        

  

 

                UA PROTEIN DIPSTICK (test code = PROU) NEGATIVE mg/dL  NEGATIVE 

        

 

                UA UROBILINIOGEN DIPSTICK (test code = URO) Normal mg/dL    NEGA

TIVE         

 

                UA NITRITE DIPSTICK (test code = DYLON) NEGATIVE        NEGATIVE 

        

 

                    UA LEUKOCYTE ESTERASE W REFLEX (test code = LEUUR) 75 Jeremie/uL

 (1+) Jeremie/uL 

NEGATIVE                                 

 

                UA WBC (test code = WBCU)  per HPF        0-5              

 

                UA RBC (test code = RBCU)  per HPF        0-5              

 

                UA EPITHELIAL CELLS (test code = EPIU)  per HPF        Few      

        

 

                UA BACTERIA (test code = BACU)  per HPF        NONE             





Urine Source? Clean Catch- CT CHEST W/O [id number] 17:32:00  Name: 
ALFREDA JENKINS                  [facility]                     : 
1945 Age/S: 74  / M         [address]                Unit #: [id number]
[id number]     Loc:               [place]  [address]              Phys: Martin Marte MD                                               Acct: [id number]  Di
s Date:               Status: REG ER                                  PHONE #: [phone]
[phone]     Exam Date: 2019  1803                     FAX #: [phone]
[phone]      Reason: RUL mass                                            EXAMS:     
                                         CPT CODE:      [id number] CT CHEST W/O 
CONTRAST                      [address]                    REASON FOR EXAM: RUL mass
              EXAM ORDER DATE: 2019 4:08 PM               Ordering MLIU: KAMLESH Marte MD               PROCEDURE:  - CT CHEST W/O CONTRAST          
    COMPARISON: 2017               FINDINGS: CT images of the chest were o
btained without IV contrast.       Reconstructed sagittal and coronal images of 
the chest were provided       for interpretation.  Dose modulation, iterative re
construction, and/or       weight based adjustment of the MA/KV was utilized to 
reduce the       radiation dose to as low as reasonably achievable.             
  The heart size is minimally enlarged.  No evidence of pericardial       effus
ion.       The thoracic aorta is aorta is unremarkable.        No evidence of me
diastinal or hilar adenopathy.       No evidence of pleural effusion.           
     IMPRESSION: 4.7 x 2 cm lobulated mass in the right upper lobe, new         
since 2017 exam..  Minimal pleural calcifications more pronounced         
on the left.  Minimal cardiomegaly and hyperinflated lungs.          Cholelithia
sis.          ** Electronically Signed by PEPE Shah on 2019 at 1732 ** 
                    Reported and signed by: Rusty Shah M.D.             CC: Martin Benson MD                                                                
                                                  Technologist:JOHAN CLARK(R)      CT    CTDI:        DLP:        Trnscb Date/Time: 2019 (1732) Bessie MATHIASL                        Orig Print D/T: S: 2019 (7704)      PAGE  1 
                     Signed Report                               BASIC METABOLIC
[id number] 16:42:00* 



                Test Item       Value           Reference Range Comments

 

                SODIUM (test code = NA) 138 mmol/L      136-145          

 

                POTASSIUM (test code = K) 3.9 mmol/L      3.5-5.1          

 

                CHLORIDE (test code = CL) 106.0 mmol/L               

 

                CARBON DIOXIDE (test code = CO2) 23.0 mmol/L     21-32          

  

 

                ANION GAP (test code = GAP) 12.9            10-20            

 

                GLUCOSE (test code = GLU) 118 mg/dL                  

 

                BLOOD UREA NITROGEN (test code = BUN) 27 mg/dL        7-18      

       

 

                GLOMERULAR FILTRATION RATE (test code = GFR) 39 mL/min       >=6

0            Estimated GFR by 

using Modified MDRD formula.Chronic kidney disease is defined as either kidney 
damageor GFR <60 mL/min/1.73 m2 for >3 months.

 

                CREATININE (test code = CREAT) 1.70 mg/dL      0.7-1.3          

 

                BUN/CREATININE RATIO (test code = BUN/CREA) 16.2            10-2

0            

 

                CALCIUM (test code = CA) 8.3 mg/dL       8.5-10.1         





HEPATIC FUNCTION [id number] 16:42:00* 



                Test Item       Value           Reference Range Comments

 

                TOTAL PROTEIN (test code = PROT) 6.4 gram/dL     6.4-8.2        

  

 

                ALBUMIN (test code = ALB) 2.6 g/dL        3.4-5.0          

 

                GLOBULIN (test code = GLOB) 3.8 gram/dL     2.7-4.2          

 

                ALBUMIN/GLOBULIN RATIO (test code = A/G) 0.7             0.75-1.

50        

 

                BILIRUBIN TOTAL (test code = BILT) 0.70 mg/dL      0.0-1.0      

    

 

                BILIRUBIN DIRECT (test code = BILD) 0.25 mg/dL      0.0-0.20    

     

 

                SGOT/AST (test code = AST) 9 IUnit/L       15-37            

 

                SGPT/ALT (test code = ALT) 14 IUnit/L      12-78            

 

                ALKALINE PHOSPHATASE TOTAL (test code = ALKP) 93 IUnit/L      45

-117          **Note change in

reference range due to change in reagent.**





[id number] 16:42:00* 



                Test Item       Value           Reference Range Comments

 

                LIPASE (test code = LIP) 64 U/L          73.0-393.0       





[id number] 16:42:00* 



                Test Item       Value           Reference Range Comments

 

                MAGNESIUM (test code = MAG) 2.0 mg/dL       1.8-2.4          





[id number] 16:42:00* 



                Test Item       Value           Reference Range Comments

 

                TROPONIN-I (test code = TROPI) <0.015 ng/mL    0-0.045          





PROCALCITONIN (PCT)2019 16:39:00* 



                Test Item       Value           Reference Range Comments

 

                PROCALCITONIN (PCT) (test code = PROCAL) 0.27 ng/ml             

         Concentration   

Interpretation   (ng/mL)      -------------   
--------------------------------------------<0.51           Sepsis is not 
likely.                Local bacterial infection is possible.                
(LOW RISK for progression to Sepsis) 0.51 - 2.00     Sepsis is possible, but 
other conditions                are known to elevate PCT as well.               
(MODERATE RISK for progression to Sepsis) > 2.00          Sepsis is likely, 
unless other causes are                known.                (HIGH RISK for 
progression to Severe Sepsis                or Septic Shock) 10.00           
High likelihood of Severe Sepsis or Septic  or higher     Shock.                
*Increased PCT levels may not always be related to systemic bacterial 
infection.*Low PCT levels do not automatically exclude the presence of bacterial
infection.*All results should be interpreted taking into account the  patients 
history.





B-TYPE NATRIURETIC [id number] 16:28:00* 



                Test Item       Value           Reference Range Comments

 

                B-TYPE NATRIURETIC PEPTIDE (test code = BNP) 318.66 pgram/mL 0-1

00            





LACTIC [id number] 16:28:00* 



                Test Item       Value           Reference Range Comments

 

                LACTIC ACID (test code = LACT) 2.5 mmol/L      0.4-1.9         R

amandaults called to [id number] by 

V.LAB. 19 1628Critical results verified and read back by Nurse? Y





PROTHROMBIN [id number] 16:20:00* 



                Test Item       Value           Reference Range Comments

 

                PROTHROMBIN TIME PATIENT (test code = PTP) 17.0 seconds    9.0-1

4.0         

 

                INTERNATIONAL NORMAL RATIO (test code = INR) 1.4             0.8

-1.2         The therapeutic range 

for oral anticoagulant therapy formost indications is an international 
normalized ratio (INR)of between 2.0 and 3.0.  The recommended therapeutic 
INRrange for various clinical situations is listed 
below:_________________________________________________________Clinical 
Situation                          INR 
range_________________________________________________________ Pulmonary e
mbolism treatment              (2.0-3.0)Venous thrombosis treatmentVenous 
thrombosis prophylaxis (high risk surgery)Prevention of systemic embolism from: 
       Acute myocardial infarction         Valvular heart disease         Atrial
fibrillation Mechanical prosthetic heart valves          (2.5-3.5)





IS PATIENT ON ANTICOAGULANTS? NTHROMBOPLASTIN TIME [id number] 16:20:00* 



                Test Item       Value           Reference Range Comments

 

                THROMBOPLASTIN TIME PARTIAL (test code = PTT) 29.3 seconds    25

.0-36.5        





IS PATIENT ON ANTICOAGULANTS? NBASIC METABOLIC [id number] 16:15:00* 



                Test Item       Value           Reference Range Comments

 

                SODIUM (test code = NA) 138 mmol/L      136-145          

 

                POTASSIUM (test code = K) 3.9 mmol/L      3.5-5.1          

 

                CHLORIDE (test code = CL) 106.0 mmol/L               

 

                CARBON DIOXIDE (test code = CO2)  mmol/L         21-32          

  

 

                ANION GAP (test code = GAP)                 10-20            

 

                GLUCOSE (test code = GLU)  mg/dL                     

 

                BLOOD UREA NITROGEN (test code = BUN)  mg/dL          7-18      

       

 

                GLOMERULAR FILTRATION RATE (test code = GFR)  mL/min         >=6

0             

 

                CREATININE (test code = CREAT)  mg/dL          0.7-1.3          

 

                BUN/CREATININE RATIO (test code = BUN/CREA)                 10-2

0            

 

                CALCIUM (test code = CA)  mg/dL          8.5-10.1         





HEPATIC FUNCTION [id number] 16:15:00* 



                Test Item       Value           Reference Range Comments

 

                TOTAL PROTEIN (test code = PROT)  gram/dL        6.4-8.2        

  

 

                ALBUMIN (test code = ALB)  g/dL           3.4-5.0          

 

                GLOBULIN (test code = GLOB)  gram/dL        2.7-4.2          

 

                ALBUMIN/GLOBULIN RATIO (test code = A/G)                 0.75-1.

50        

 

                BILIRUBIN TOTAL (test code = BILT)  mg/dL          0.0-1.0      

    

 

                BILIRUBIN DIRECT (test code = BILD)  mg/dL          0.0-0.20    

     

 

                SGOT/AST (test code = AST)  IUnit/L        15-37            

 

                SGPT/ALT (test code = ALT)  IUnit/L        12-78            

 

                ALKALINE PHOSPHATASE TOTAL (test code = ALKP)  IUnit/L        45

-117           





[id number] 16:15:00* 



                Test Item       Value           Reference Range Comments

 

                LIPASE (test code = LIP)  U/L            73.0-393.0       





[id number] 16:15:00* 



                Test Item       Value           Reference Range Comments

 

                MAGNESIUM (test code = MAG)  mg/dL          1.8-2.4          





[id number] 16:15:00* 



                Test Item       Value           Reference Range Comments

 

                TROPONIN-I (test code = TROPI)  ng/mL          0-0.045          





CBC W/O [id number] 16:04:00* 



                Test Item       Value           Reference Range Comments

 

                WHITE BLOOD CELL (test code = WBC) 19.3 K/mm3      4.5-12.5     

    

 

                RED BLOOD CELL (test code = RBC) 4.71 mill/mm3   4.0-5.8        

  

 

                HEMOGLOBIN (test code = HGB) 12.4 gram/dL    13.0-17.5        

 

                HEMATOCRIT (test code = HCT) 40.3 %          42.0-52.0        

 

                MEAN CELL VOLUME (test code = MCV) 85.6 fL         80-98        

    

 

                MEAN CELL HGB (test code = MCH) 26.3 picogram   27.0-33.0       

 

 

                MEAN CELL HGB CONCETRATION (test code = MCHC) 30.8 gram/dL    33

.0-36.0        

 

                RED CELL DISTRIBUTION WIDTH (test code = RDW) 22.2 %          11

.6-16.2        

 

                PLATELET COUNT (test code = PLT) 198 K/mm3       150-450        

  

 

                MEAN PLATELET VOLUME (test code = MPV) 9.8 fL          6.7-11.0 

        





POC LACTIC [id number] 15:58:00* 



                Test Item       Value           Reference Range Comments

 

                POC LACTIC ACID (test code = POCLAC) 2.33 MMOL/L     0.4-2.2    

      





- XR CHEST 1 -18-81 15:58:00 FAX: Martin Luna [phone]
   [place]: B   St: REG----------
---------------------------------------------------------------------  Name:   ALFREDA SOLANO                 [facility]                     : [birth year]  Age/S: 74/M           [address]                Unit #: [id number]    
 Loc: [place]  [address]              Phys: Martin Marte MD
                                              Acct: [id number] Dis Date:      
        Status: REG ER                                 PHONE #: [phone]    
Exam Date:     2019     1550                   FAX #: [phone]     
Reason: WEAKNESS                                           EXAMS:               
                               CPT CODE:      [id number] XR CHEST 1 V            
                  [address]                    TECHNIQUE - XR CHEST 1 V .           
     COMPARISON: Chest x-ray 2019               HISTORY:       74 years Male
        WEAKNESS                                FINDINGS:               Lungs: 
1.5 cm irregular mass right upper lobe suggested. Recommend CT       chest to 
further evaluate. Neoplastic process cannot be excluded.       Lungs are normal 
in volume.               Mediastinum and ana: No enlargement or other mass.    
          Cardiovascular structures: Mild cardiomegaly.  No abnormalities in    
  vascular structures.                Pleura/CP angles: Clear.  No pneumothorax.
              Bones: No osseous abnormalities.               Soft tissues: No 
abnormalities.               Tubes and lines: Left subclavian pacer wires in 
place.               Other: None.                 IMPRESSION:         1.5 cm irr
egular mass right upper lobe suggested. Recommend CT chest         to further ev
aluate. Neoplastic process cannot be excluded.          ** Electronically Signed
by Trent Ivan M.D. on 2019 at 6050 **                      Reported and s
igned by: Trent Ivan M.D.      CC: Martin Marte MD                        
                                                                                
         Technologist: RT OSCAR(R)                                  
Trnscrd Date/Time/By: 2019 (0317) : By: Tiana Barker Print D/T
: S: 2019 (0508)                         PAGE  1                       Sig
tej Report                               - CT ABD PELVIS W/O [id number] 
14:37:00  Name: ALFREDA JENKINS                  [facility]                
    : 1945 Age/S: 73  / M         [address] [facility]                Unit 
#: [id number]     Loc:               [place]  [address]              Phys: 
Carmen Ruffin MD                                               Acct: 
[id number]  Dis Date:               Status: REG ER                            
     PHONE #: [phone]     Exam Date: 2019  1422                     
FAX #: [phone]      Reason: hematuria                                      
    EXAMS:                                               CPT CODE:      
[id number] CT ABD PELVIS W/O CONT                     [address]                    
HISTORY: Hematuria.               COMPARISON: CT scan from 2018.  
            CT of abdomen and pelvis: Stone protocol.  Automated exposure 
control.               CT of abdomen:               The lung bases demonstrating
scarring and COPD.               Noncontrast liver is unremarkable.  The liver 
measured 17 cm in       length.  Gallstone noted again within the gallbladder 
without       inflammatory changes.               Spleen is not enlarged.  The 
stomach distended incompletely however it       is normal in appearance.  1.4 cm
lipoma within the 3rd portion of the       duodenum noted again.               
Noncontrast pancreas and adrenals are normal.  Atrophied pancreas with       no 
ductal dilatation.  Retroperitoneal ketan calcifications posterior       to the 
pancreatic head noted again.  These are seen superior to the       duodenum.  
Unremarkable adrenals.               Kidneys are free from 
hydroureteronephrosis.  Left renal pelvic       calcification measured 1.7 cm 
and lower pole calyceal stone measuring       1.57.  Bilateral renal vascular 
calcifications.  2 to 3 mm calyceal       stones on the right side as well.  
Chronic perinephric fat stranding       bilaterally.               No pathologic
adenopathy.  Heavy atherosclerotic calcifications of the       abdominal and 
pelvic vasculature.               No bowel obstruction.  No colitis, 
diverticulitis or enteritis.        Constipation.  Anastomotic sutures within 
the jejunum with mild fluid       distention.               CT PELVIS:          
    Appendix is not visible but no inflammatory changes.  Pelvic bowel       
loops are unobstructed.  Sigmoid diverticulosis.  No diverticulitis.            
  Unremarkable urinary bladder.  The prostate is not enlarged.  No       pelvic 
pathologic adenopathy.  No free fluid or free air.        Subcutaneous tissues 
and the musculature are normal in appearance.  No       lytic or blastic lesions
are noted within the bony skeleton.  Gamma     PAGE  1                       Si
gned Report                    (CONTINUED)   Name: ALFREDA JENKINS             
    [facility]                     : 1945 Age/S: 73  / M         [address]                Unit #: [id number]     Loc:               [place]  [address]              Phys: Carmen Ruffin MD                          
                    Acct: [id number]  Dis Date:               Status: REG ER  
                               PHONE #: [phone]     Exam Date: 2019  
1422                     FAX #: [phone]      Reason: hematuria             
                             EXAMS:                                             
 CPT CODE:      [id number] CT ABD PELVIS W/O CONT                     [address]      
        <Continued>        nail within the right hip.  DJD.  Osteopenia.        
        IMPRESSION:                   No hydroureteronephrosis on either side.  
1.7 cm left renal pelvic         calcification and 1.5 cm right lower pole 
calyceal stone.  3 to 5 mm         right calyceal stones.  Chronic perinephric 
fat stranding.  No         obstructing ureteral stones.  Decompressed urinary 
bladder is limited.                   Appendix is not visible but no 
inflammatory changes.  Pelvic bowel         loops are unobstructed.          ** 
Electronically Signed by PEPE Hernandez on 2019 at 1437 **             
        Reported and signed by: Jarad Hernandez M.D.                           CC:
Carmen Ruffin MD                                                           
                                                       Technologist:Blake RAMESH(R)(CT)     CTDI:        DLP:        Trnscb Date/Time: 2019 (0013)
t.SDR.TH4                        Orig Print D/T: S: 2019 (3935)      PAGE 
2                       Signed Report                               URINALYSIS 
[id number] 13:23:00* 



                Test Item       Value           Reference Range Comments

 

                UA COLOR (test code = COLU) YELLOW          YELLOW           

 

                UA APPEARANCE (test code = APPU) TURBID          CLEAR          

  

 

                UA GLUCOSE DIPSTICK (test code = DGLUU) NEGATIVE mg/dL  NEGATIVE

         

 

                UA BILIRUBIN DIPSTICK (test code = BILU) NEGATIVE mg/dL  NEGATIV

E         

 

                UA KETONE DIPSTICK (test code = KETU) NEGATIVE mg/dL  NEGATIVE  

       

 

                UA SPECIFIC GRAVITY (test code = SGU) 1.017           1.001-1.03

5      

 

                UA BLOOD DIPSTICK (test code = SHEELA) 0.5 mg/dL (2+) mg/dL NEGATIV

E         

 

                UA PH DIPSTICK (test code = ISMAEL) 5.5             5.0-8.0        

  

 

                UA PROTEIN DIPSTICK (test code = PROU) 30 (1+) mg/dL   NEGATIVE 

        

 

                UA UROBILINIOGEN DIPSTICK (test code = URO) Normal mg/dL    NEGA

TIVE         

 

                UA NITRITE DIPSTICK (test code = DYLON) NEGATIVE        NEGATIVE 

        

 

                    UA LEUKOCYTE ESTERASE W REFLEX (test code = LEUUR) 500 Jeremie/u

L (3+) Jeremie/uL 

NEGATIVE                                 

 

                UA WBC (test code = WBCU) >200 per HPF    0-5              

 

                UA RBC (test code = RBCU) >200 #/HPF      0-5              

 

                UA WBC CLUMPS (test code = WBCUCL) >10 /HPF        NONE         

    

 

                UA EPITHELIAL CELLS (test code = EPIU) Few (2-5/hpf) per HPF FEW

              

 

                UA BACTERIA (test code = BACU) MODERATE #/HPF  NONE             

 

                UA RENAL CELLS (test code = CODY) 0-2 #/HPF       0-5           

   

 

                UA MUCUS (test code = MUCU) FEW #/LPF       FEW              

 

                UA YEAST (test code = YEASTU) FEW #/HPF       NONE             





Urine Source? Clean CatchCOMPREHENSIVE METABOLIC [id number] 11:18:00* 



                Test Item       Value           Reference Range Comments

 

                SODIUM (test code = NA) 143 mmol/L      136-145          

 

                POTASSIUM (test code = K) 4.1 mmol/L      3.5-5.1          Speci

men  1+  Hemolysed.Some 

results MAY NOT be accurate due to hemolysis.

 

                CHLORIDE (test code = CL) 110.0 mmol/L               

 

                CARBON DIOXIDE (test code = CO2) 26.0 mmol/L     21-32          

  

 

                ANION GAP (test code = GAP) 11.1            10-20            

 

                GLUCOSE (test code = GLU) 88 mg/dL                   

 

                BLOOD UREA NITROGEN (test code = BUN) 27 mg/dL        7-18      

       

 

                GLOMERULAR FILTRATION RATE (test code = GFR) 49 mL/min       >=6

0            Estimated GFR by 

using Modified MDRD formula.Chronic kidney disease is defined as either kidney 
damageor GFR <60 mL/min/1.73 m2 for >3 months.

 

                CREATININE (test code = CREAT) 1.40 mg/dL      0.7-1.3          

 

                BUN/CREATININE RATIO (test code = BUN/CREA) 19.3            10-2

0            

 

                TOTAL PROTEIN (test code = PROT) 6.1 gram/dL     6.4-8.2        

  

 

                ALBUMIN (test code = ALB) 2.4 g/dL        3.4-5.0          

 

                GLOBULIN (test code = GLOB) 3.7 gram/dL     2.7-4.2          

 

                ALBUMIN/GLOBULIN RATIO (test code = A/G) 0.7             0.75-1.

50        

 

                CALCIUM (test code = CA) 8.6 mg/dL       8.5-10.1         

 

                BILIRUBIN TOTAL (test code = BILT) 0.60 mg/dL      0.0-1.0      

    

 

                SGOT/AST (test code = AST) 32 IUnit/L      15-37            

 

                SGPT/ALT (test code = ALT) 29 IUnit/L      12-78            

 

                ALKALINE PHOSPHATASE TOTAL (test code = ALKP) 100 IUnit/L     45

-117          **Note change 

in reference range due to change in reagent.**





[id number] 11:18:00* 



                Test Item       Value           Reference Range Comments

 

                LIPASE (test code = LIP) 151 U/L         73.0-393.0       





COMPREHENSIVE METABOLIC [id number] 11:11:00* 



                Test Item       Value           Reference Range Comments

 

                SODIUM (test code = NA) 143 mmol/L      136-145          

 

                POTASSIUM (test code = K) 4.1 mmol/L      3.5-5.1          Speci

men  1+  Hemolysed.Some 

results MAY NOT be accurate due to hemolysis.

 

                CHLORIDE (test code = CL) 110.0 mmol/L               

 

                CARBON DIOXIDE (test code = CO2)  mmol/L         21-32          

  

 

                ANION GAP (test code = GAP)                 10-20            

 

                GLUCOSE (test code = GLU)  mg/dL                     

 

                BLOOD UREA NITROGEN (test code = BUN)  mg/dL          7-18      

       

 

                GLOMERULAR FILTRATION RATE (test code = GFR)  mL/min         >=6

0             

 

                CREATININE (test code = CREAT)  mg/dL          0.7-1.3          

 

                BUN/CREATININE RATIO (test code = BUN/CREA)                 10-2

0            

 

                TOTAL PROTEIN (test code = PROT)  gram/dL        6.4-8.2        

  

 

                ALBUMIN (test code = ALB)  g/dL           3.4-5.0          

 

                GLOBULIN (test code = GLOB)  gram/dL        2.7-4.2          

 

                ALBUMIN/GLOBULIN RATIO (test code = A/G)                 0.75-1.

50        

 

                CALCIUM (test code = CA)  mg/dL          8.5-10.1         

 

                BILIRUBIN TOTAL (test code = BILT)  mg/dL          0.0-1.0      

    

 

                SGOT/AST (test code = AST)  IUnit/L        15-37            

 

                SGPT/ALT (test code = ALT)  IUnit/L        12-78            

 

                ALKALINE PHOSPHATASE TOTAL (test code = ALKP)  IUnit/L        45

-117           





[id number] 11:11:00* 



                Test Item       Value           Reference Range Comments

 

                LIPASE (test code = LIP)  U/L            73.0-393.0       





CBC W/AUTO [id number] 11:07:00* 



                Test Item       Value           Reference Range Comments

 

                WHITE BLOOD CELL (test code = WBC) 14.8 K/mm3      4.5-12.5     

    

 

                RED BLOOD CELL (test code = RBC) 4.75 mill/mm3   4.0-5.8        

  

 

                HEMOGLOBIN (test code = HGB) 12.0 gram/dL    13.0-17.5        

 

                HEMATOCRIT (test code = HCT) 38.3 %          42.0-52.0        

 

                MEAN CELL VOLUME (test code = MCV) 80.6 fL         80-98        

    

 

                MEAN CELL HGB (test code = MCH) 25.3 picogram   27.0-33.0       

 

 

                MEAN CELL HGB CONCETRATION (test code = MCHC) 31.3 gram/dL    33

.0-36.0        

 

                RED CELL DISTRIBUTION WIDTH (test code = RDW) 19.9 %          11

.6-16.2        

 

                RED CELL DISTRIBUTION WIDTH SD (test code = RDW-SD) 57.8 fL     

    37.0-51.0        

 

                PLATELET COUNT (test code = PLT) 241 K/mm3       150-450        

  

 

                MEAN PLATELET VOLUME (test code = MPV) 9.3 fL          6.7-11.0 

        

 

                NEUTROPHIL % (test code = NT%) 82.3 %          39.0-69.0        

 

                IMMATURE GRANULOCYTE % (test code = IG%) 0.7 %           0.0-5.0

          

 

                LYMPHOCYTE % (test code = LY%) 9.8 %           25.0-55.0        

 

                MONOCYTE % (test code = MO%) 5.5 %           0.0-10.0         

 

                EOSINOPHIL % (test code = EO%) 1.6 %           0.0-5.0          

 

                BASOPHIL % (test code = BA%) 0.1 %           0.0-1.0          

 

                NUCLEATED RBC % (test code = NRBC%) 0.0 %           0-0         

     

 

                NEUTROPHIL # (test code = NT#) 12.14 K/mm3     1.8-7.7          

 

                IMMATURE GRANULOCYTE # (test code = IG#) 0.10 x10 3/uL   0-0.03 

          

 

                LYMPHOCYTE # (test code = LY#) 1.45 K/mm3      1.0-5.0          

 

                MONOCYTE # (test code = MO#) 0.81 K/mm3      0-0.8            

 

                EOSINOPHIL # (test code = EO#) 0.24 K/mm3      0.0-0.5          

 

                BASOPHIL # (test code = BA#) 0.02 K/mm3      0.0-0.2          

 

                NUCLEATED RBC # (test code = NRBC#) 0.00 K/mm3      0.0-0.1     

     

 

                MANUAL DIFF REQUIRED (test code = MDIFF) NO                     

          





- XR FOOT 3 + V [id number] 10:42:00 FAX: Carmen Ortiz 
[phone]    [place]:    St: PRE----------
---------------------------------------------------------------------  Name:   ALIN
JANEALFREDA MAYNARD                 [facility]                     : [birth year]  Age/S: 73/M           [address] [facility]                Unit #: [id number]    
 Loc: [place]  [address]              Phys: Carmen Ruffin MD
                                              Acct: [id number] Dis Date:      
        Status: PRE ER                                 PHONE #: [phone]    
Exam Date:     2019     1021                   FAX #: [phone]     
Reason: fall, R foot pain                                  EXAMS:               
                               CPT CODE:      [id number] XR FOOT 3 + V RT        
                  [id number]                    HISTORY: Fall and pain.              
COMPARISON: None available.               3 views of the right foot:            
  Acute traumatic nondisplaced fracture of the medial base of the 1st       
proximal phalanx without intra-articular extension.  Diffuse soft       tissue 
swelling of the 1st digit.  Polyarticular joint space       narrowing.  Pes plan
us.  Os peroneum.  Vascular calcifications.        Osteopenia.  No ankle joint f
luid.                 IMPRESSION:                   Acute traumatic nondisplaced
fracture of the medial base of the 1st         proximal phalanx without articul
ar extension.  Diffuse soft tissue         swelling.          ** Electronically 
Signed by PEPE Hernandez on 2019 at 1042 **                      Report
ed and signed by: Jarad Hernandez M.D.                       CC: Carmen Ruffin MD                                                                             
                                     Technologist: Chavo Echevarria RT(R)            
                         Trnscrd Date/Time/By: 2019 ([id number]) : By: [email or web address]
          Orig Print D/T: S: 2019 (0895)                         PAGE  1  
                    Signed Report                               COAGULATION TIME
[id number] 12:04:00* 



                Test Item       Value           Reference Range Comments

 

                COAGULATION TIME ACTIVATED (test code = ACT) 371 seconds     62.

8-88.0        





CBC W/AUTO [id number] 10:38:00* 



                Test Item       Value           Reference Range Comments

 

                WHITE BLOOD CELL (test code = WBC) 14.8 K/mm3      4.5-12.5     

    

 

                RED BLOOD CELL (test code = RBC) 5.47 mill/mm3   4.0-5.8        

  

 

                HEMOGLOBIN (test code = HGB) 13.7 gram/dL    13.0-17.5        

 

                HEMATOCRIT (test code = HCT) 45.3 %          42.0-52.0        

 

                MEAN CELL VOLUME (test code = MCV) 82.8 fL         80-98        

    

 

                MEAN CELL HGB (test code = MCH) 25.0 picogram   27.0-33.0       

 

 

                MEAN CELL HGB CONCETRATION (test code = MCHC) 30.2 gram/dL    33

.0-36.0        

 

                RED CELL DISTRIBUTION WIDTH (test code = RDW) 20.4 %          11

.6-16.2        

 

                RED CELL DISTRIBUTION WIDTH SD (test code = RDW-SD) 59.7 fL     

    37.0-51.0        

 

                PLATELET COUNT (test code = PLT) 240 K/mm3       150-450        

  

 

                MEAN PLATELET VOLUME (test code = MPV) 9.4 fL          6.7-11.0 

        

 

                NEUTROPHIL % (test code = NT%) 73.0 %          39.0-69.0        

 

                IMMATURE GRANULOCYTE % (test code = IG%) 0.5 %           0.0-5.0

          

 

                LYMPHOCYTE % (test code = LY%) 16.3 %          25.0-55.0        

 

                MONOCYTE % (test code = MO%) 7.3 %           0.0-10.0         

 

                EOSINOPHIL % (test code = EO%) 2.4 %           0.0-5.0          

 

                BASOPHIL % (test code = BA%) 0.5 %           0.0-1.0          

 

                NUCLEATED RBC % (test code = NRBC%) 0.0 %           0-0         

     

 

                NEUTROPHIL # (test code = NT#) 10.80 K/mm3     1.8-7.7          

 

                IMMATURE GRANULOCYTE # (test code = IG#) 0.08 x10 3/uL   0-0.03 

          

 

                LYMPHOCYTE # (test code = LY#) 2.41 K/mm3      1.0-5.0          

 

                MONOCYTE # (test code = MO#) 1.08 K/mm3      0-0.8            

 

                EOSINOPHIL # (test code = EO#) 0.35 K/mm3      0.0-0.5          

 

                BASOPHIL # (test code = BA#) 0.08 K/mm3      0.0-0.2          

 

                NUCLEATED RBC # (test code = NRBC#) 0.00 K/mm3      0.0-0.1     

     

 

                MANUAL DIFF REQUIRED (test code = MDIFF) NO, ONLY SCAN NEEDED   

               





BILDIFFERENTIAL [id number] 10:38:00* 



                Test Item       Value           Reference Range Comments

 

                STAIN ACCEPTABILITY (test code = STN ACCEPTABLE) STAIN ACCEPTABL

E                  

 

                ANISOCYTOSIS (test code = ANISO) 1+                             

  

 

                MICROCYTOSIS (test code = MICR) 1+                              

 

 

                PLATELET ESTIMATE (test code = PLTEST) ADEQUATE                 

        

 

                PLATELET MORPHOLOGY (test code = PLTMORPH) SIZE VARIABLE        

            





BILCBC W/AUTO [id number] 10:35:00* 



                Test Item       Value           Reference Range Comments

 

                WHITE BLOOD CELL (test code = WBC) 14.8 K/mm3      4.5-12.5     

    

 

                RED BLOOD CELL (test code = RBC) 5.47 mill/mm3   4.0-5.8        

  

 

                HEMOGLOBIN (test code = HGB) 13.7 gram/dL    13.0-17.5        

 

                HEMATOCRIT (test code = HCT) 45.3 %          42.0-52.0        

 

                MEAN CELL VOLUME (test code = MCV) 82.8 fL         80-98        

    

 

                MEAN CELL HGB (test code = MCH) 25.0 picogram   27.0-33.0       

 

 

                MEAN CELL HGB CONCETRATION (test code = MCHC) 30.2 gram/dL    33

.0-36.0        

 

                RED CELL DISTRIBUTION WIDTH (test code = RDW) 20.4 %          11

.6-16.2        

 

                RED CELL DISTRIBUTION WIDTH SD (test code = RDW-SD) 59.7 fL     

    37.0-51.0        

 

                PLATELET COUNT (test code = PLT) 240 K/mm3       150-450        

  

 

                MEAN PLATELET VOLUME (test code = MPV) 9.4 fL          6.7-11.0 

        

 

                NEUTROPHIL % (test code = NT%) 73.0 %          39.0-69.0        

 

                IMMATURE GRANULOCYTE % (test code = IG%) 0.5 %           0.0-5.0

          

 

                LYMPHOCYTE % (test code = LY%) 16.3 %          25.0-55.0        

 

                MONOCYTE % (test code = MO%) 7.3 %           0.0-10.0         

 

                EOSINOPHIL % (test code = EO%) 2.4 %           0.0-5.0          

 

                BASOPHIL % (test code = BA%) 0.5 %           0.0-1.0          

 

                NUCLEATED RBC % (test code = NRBC%) 0.0 %           0-0         

     

 

                NEUTROPHIL # (test code = NT#) 10.80 K/mm3     1.8-7.7          

 

                IMMATURE GRANULOCYTE # (test code = IG#) 0.08 x10 3/uL   0-0.03 

          

 

                LYMPHOCYTE # (test code = LY#) 2.41 K/mm3      1.0-5.0          

 

                MONOCYTE # (test code = MO#) 1.08 K/mm3      0-0.8            

 

                EOSINOPHIL # (test code = EO#) 0.35 K/mm3      0.0-0.5          

 

                BASOPHIL # (test code = BA#) 0.08 K/mm3      0.0-0.2          

 

                NUCLEATED RBC # (test code = NRBC#) 0.00 K/mm3      0.0-0.1     

     

 

                MANUAL DIFF REQUIRED (test code = MDIFF) NO, ONLY SCAN NEEDED   

               





BILDIFFERENTIAL [id number] 10:35:00* 



                Test Item       Value           Reference Range Comments

 

                STAIN ACCEPTABILITY (test code = STN ACCEPTABLE)                

                  

 

                MORPHOLOGY COMMENT (test code = MOC)                            

      

 

                PLATELET ESTIMATE (test code = PLTEST)                          

        

 

                PLATELET MORPHOLOGY (test code = PLTMORPH)                      

            





BILCBC W/AUTO [id number] 10:34:00* 



                Test Item       Value           Reference Range Comments

 

                WHITE BLOOD CELL (test code = WBC) 14.8 K/mm3      4.5-12.5     

    

 

                RED BLOOD CELL (test code = RBC) 5.47 mill/mm3   4.0-5.8        

  

 

                HEMOGLOBIN (test code = HGB) 13.7 gram/dL    13.0-17.5        

 

                HEMATOCRIT (test code = HCT) 45.3 %          42.0-52.0        

 

                MEAN CELL VOLUME (test code = MCV) 82.8 fL         80-98        

    

 

                MEAN CELL HGB (test code = MCH) 25.0 picogram   27.0-33.0       

 

 

                MEAN CELL HGB CONCETRATION (test code = MCHC) 30.2 gram/dL    33

.0-36.0        

 

                RED CELL DISTRIBUTION WIDTH (test code = RDW) 20.4 %          11

.6-16.2        

 

                RED CELL DISTRIBUTION WIDTH SD (test code = RDW-SD) 59.7 fL     

    37.0-51.0        

 

                PLATELET COUNT (test code = PLT) 240 K/mm3       150-450        

  

 

                MEAN PLATELET VOLUME (test code = MPV) 9.4 fL          6.7-11.0 

        

 

                NEUTROPHIL % (test code = NT%) 73.0 %          39.0-69.0        

 

                IMMATURE GRANULOCYTE % (test code = IG%) 0.5 %           0.0-5.0

          

 

                LYMPHOCYTE % (test code = LY%) 16.3 %          25.0-55.0        

 

                MONOCYTE % (test code = MO%) 7.3 %           0.0-10.0         

 

                EOSINOPHIL % (test code = EO%) 2.4 %           0.0-5.0          

 

                BASOPHIL % (test code = BA%) 0.5 %           0.0-1.0          

 

                NUCLEATED RBC % (test code = NRBC%) 0.0 %           0-0         

     

 

                NEUTROPHIL # (test code = NT#) 10.80 K/mm3     1.8-7.7          

 

                IMMATURE GRANULOCYTE # (test code = IG#) 0.08 x10 3/uL   0-0.03 

          

 

                LYMPHOCYTE # (test code = LY#) 2.41 K/mm3      1.0-5.0          

 

                MONOCYTE # (test code = MO#) 1.08 K/mm3      0-0.8            

 

                EOSINOPHIL # (test code = EO#) 0.35 K/mm3      0.0-0.5          

 

                BASOPHIL # (test code = BA#) 0.08 K/mm3      0.0-0.2          

 

                NUCLEATED RBC # (test code = NRBC#) 0.00 K/mm3      0.0-0.1     

     

 

                MANUAL DIFF REQUIRED (test code = MDIFF) NO, ONLY SCAN NEEDED   

               





BILDIFFERENTIAL [id number] 10:34:00* 



                Test Item       Value           Reference Range Comments

 

                STAIN ACCEPTABILITY (test code = STN ACCEPTABLE)                

                  

 

                MORPHOLOGY COMMENT (test code = MOC)                            

      

 

                PLATELET ESTIMATE (test code = PLTEST)                          

        

 

                PLATELET MORPHOLOGY (test code = PLTMORPH)                      

            





BILCB W/AUTO [id number] 10:34:00* 



                Test Item       Value           Reference Range Comments

 

                WHITE BLOOD CELL (test code = WBC) 14.8 K/mm3      4.5-12.5     

    

 

                RED BLOOD CELL (test code = RBC) 5.47 mill/mm3   4.0-5.8        

  

 

                HEMOGLOBIN (test code = HGB) 13.7 gram/dL    13.0-17.5        

 

                HEMATOCRIT (test code = HCT) 45.3 %          42.0-52.0        

 

                MEAN CELL VOLUME (test code = MCV) 82.8 fL         80-98        

    

 

                MEAN CELL HGB (test code = MCH) 25.0 picogram   27.0-33.0       

 

 

                MEAN CELL HGB CONCETRATION (test code = MCHC) 30.2 gram/dL    33

.0-36.0        

 

                RED CELL DISTRIBUTION WIDTH (test code = RDW) 20.4 %          11

.6-16.2        

 

                RED CELL DISTRIBUTION WIDTH SD (test code = RDW-SD) 59.7 fL     

    37.0-51.0        

 

                PLATELET COUNT (test code = PLT) 240 K/mm3       150-450        

  

 

                MEAN PLATELET VOLUME (test code = MPV) 9.4 fL          6.7-11.0 

        

 

                NEUTROPHIL % (test code = NT%) 73.0 %          39.0-69.0        

 

                IMMATURE GRANULOCYTE % (test code = IG%) 0.5 %           0.0-5.0

          

 

                LYMPHOCYTE % (test code = LY%) 16.3 %          25.0-55.0        

 

                MONOCYTE % (test code = MO%) 7.3 %           0.0-10.0         

 

                EOSINOPHIL % (test code = EO%) 2.4 %           0.0-5.0          

 

                BASOPHIL % (test code = BA%) 0.5 %           0.0-1.0          

 

                NUCLEATED RBC % (test code = NRBC%) 0.0 %           0-0         

     

 

                NEUTROPHIL # (test code = NT#) 10.80 K/mm3     1.8-7.7          

 

                IMMATURE GRANULOCYTE # (test code = IG#) 0.08 x10 3/uL   0-0.03 

          

 

                LYMPHOCYTE # (test code = LY#) 2.41 K/mm3      1.0-5.0          

 

                MONOCYTE # (test code = MO#) 1.08 K/mm3      0-0.8            

 

                EOSINOPHIL # (test code = EO#) 0.35 K/mm3      0.0-0.5          

 

                BASOPHIL # (test code = BA#) 0.08 K/mm3      0.0-0.2          

 

                NUCLEATED RBC # (test code = NRBC#) 0.00 K/mm3      0.0-0.1     

     

 

                MANUAL DIFF REQUIRED (test code = MDIFF) NO, ONLY SCAN NEEDED   

               





BILDIFFERENTIAL [id number] 10:34:00* 



                Test Item       Value           Reference Range Comments

 

                STAIN ACCEPTABILITY (test code = STN ACCEPTABLE)                

                  

 

                CABOT RINGS (test code = CAB)                                  

 

                MORPHOLOGY COMMENT (test code = MOC)                            

      

 

                PLATELET ESTIMATE (test code = PLTEST)                          

        

 

                PLATELET MORPHOLOGY (test code = PLTMORPH)                      

            





BILPROTHROMBIN [id number] 07:52:00* 



                Test Item       Value           Reference Range Comments

 

                PROTHROMBIN TIME PATIENT (test code = PTP) 14.1 seconds    9.0-1

4.0         

 

                INTERNATIONAL NORMAL RATIO (test code = INR) 1.2             0.8

-1.2         The therapeutic range 

for oral anticoagulant therapy formost indications is an international 
normalized ratio (INR)of between 2.0 and 3.0.  The recommended therapeutic 
INRrange for various clinical situations is listed 
below:_________________________________________________________Clinical 
Situation                          INR 
range_________________________________________________________ Pulmonary e
mbolism treatment              (2.0-3.0)Venous thrombosis treatmentVenous 
thrombosis prophylaxis (high risk surgery)Prevention of systemic embolism from: 
       Acute myocardial infarction         Valvular heart disease         Atrial
fibrillation Mechanical prosthetic heart valves          (2.5-3.5)





THROMBOPLASTIN TIME [id number] 07:52:00* 



                Test Item       Value           Reference Range Comments

 

                THROMBOPLASTIN TIME PARTIAL (test code = PTT) 35.1 seconds    25

.0-36.5        





COMPREHENSIVE METABOLIC [id number] 07:44:00* 



                Test Item       Value           Reference Range Comments

 

                SODIUM (test code = NA) 143 mmol/L      136-145          

 

                POTASSIUM (test code = K) 3.9 mmol/L      3.5-5.1          

 

                CHLORIDE (test code = CL) 110.0 mmol/L               

 

                CARBON DIOXIDE (test code = CO2) 28.0 mmol/L     21-32          

  

 

                ANION GAP (test code = GAP) 8.9             10-20            

 

                GLUCOSE (test code = GLU) 79 mg/dL                   

 

                BLOOD UREA NITROGEN (test code = BUN) 16 mg/dL        7-18      

       

 

                GLOMERULAR FILTRATION RATE (test code = GFR) > 60 mL/min     >=6

0            Estimated GFR by 

using Modified MDRD formula.Chronic kidney disease is defined as either kidney 
damageor GFR <60 mL/min/1.73 m2 for >3 months.

 

                CREATININE (test code = CREAT) 1.00 mg/dL      0.7-1.3          

 

                BUN/CREATININE RATIO (test code = BUN/CREA) 16.0            10-2

0            

 

                TOTAL PROTEIN (test code = PROT) 6.7 gram/dL     6.4-8.2        

  

 

                ALBUMIN (test code = ALB) 3.5 g/dL        3.4-5.0          

 

                GLOBULIN (test code = GLOB) 3.2 gram/dL     2.7-4.2          

 

                ALBUMIN/GLOBULIN RATIO (test code = A/G) 1.1             0.75-1.

50        

 

                CALCIUM (test code = CA) 8.6 mg/dL       8.5-10.1         

 

                BILIRUBIN TOTAL (test code = BILT) 0.80 mg/dL      0.0-1.0      

    

 

                SGOT/AST (test code = AST) 12 IUnit/L      15-37            

 

                SGPT/ALT (test code = ALT) 15 IUnit/L      12-78            

 

                ALKALINE PHOSPHATASE TOTAL (test code = ALKP) 118 IUnit/L     45

-117          **Note change 

in reference range due to change in reagent.**





LIPID PROFILE (CORONARY RISK)2019 07:44:00* 



                Test Item       Value           Reference Range Comments

 

                TRIGLYCERIDES (test code = TRIG) 74 mg/dL                 

  

 

                CHOLESTEROL (test code = CHOL) 98 mg/dL        0-200            

 

                CHOLESTEROL/HDL RATIO (test code = CHOLHDL) 2.0 RATIO       0-4.

9           RISK ASSOCIATED WITH

CHOL/HDL RATIOS:     Risk          Male       Female1/2 AVERAGE        3.43     
  3.27AVERAGE            4.97        4.442X AVERAGE         9.55        7.053X 
AVERAGE         23.39      11.04 REFERENCE VALUE IS RELATED TO RISK LEVELS 
ASRECOMMENDED BY THE MELANIE. HEART, LUNG, AND BLOOD INST.

 

                HDL CHOLESTEROL (test code = HDL) 40 mg/dL        40-60         

   

 

                LIPOPROTEIN LDL (test code = LDL) 51 mg/dL        100-129       

  

===========================================================Reference Interval:  
       mg/dL          
mmol/L-----------------------------------------------------------Optimal        
             <100           <2.6Near/above optimal          100-129        2.6-
3.3Borderline High             130-159        3.4-4.1High                       
160-189        4.1-4.9Very High                    &gt;=190          
>=4.9========= This LDL result is a direct measurement.=========





COMPREHENSIVE METABOLIC [id number] 07:33:00* 



                Test Item       Value           Reference Range Comments

 

                SODIUM (test code = NA) 143 mmol/L      136-145          

 

                POTASSIUM (test code = K) 3.9 mmol/L      3.5-5.1          

 

                CHLORIDE (test code = CL) 110.0 mmol/L               

 

                CARBON DIOXIDE (test code = CO2)  mmol/L         21-32          

  

 

                ANION GAP (test code = GAP)                 10-20            

 

                GLUCOSE (test code = GLU)  mg/dL                     

 

                BLOOD UREA NITROGEN (test code = BUN)  mg/dL          7-18      

       

 

                GLOMERULAR FILTRATION RATE (test code = GFR)  mL/min         >=6

0             

 

                CREATININE (test code = CREAT)  mg/dL          0.7-1.3          

 

                BUN/CREATININE RATIO (test code = BUN/CREA)                 10-2

0            

 

                TOTAL PROTEIN (test code = PROT)  gram/dL        6.4-8.2        

  

 

                ALBUMIN (test code = ALB)  g/dL           3.4-5.0          

 

                GLOBULIN (test code = GLOB)  gram/dL        2.7-4.2          

 

                ALBUMIN/GLOBULIN RATIO (test code = A/G)                 0.75-1.

50        

 

                CALCIUM (test code = CA)  mg/dL          8.5-10.1         

 

                BILIRUBIN TOTAL (test code = BILT)  mg/dL          0.0-1.0      

    

 

                SGOT/AST (test code = AST)  IUnit/L        15-37            

 

                SGPT/ALT (test code = ALT)  IUnit/L        12-78            

 

                ALKALINE PHOSPHATASE TOTAL (test code = ALKP)  IUnit/L        45

-117           





LIPID PROFILE (CORONARY RISK)2019 07:33:00* 



                Test Item       Value           Reference Range Comments

 

                TRIGLYCERIDES (test code = TRIG)  mg/dL                   

  

 

                CHOLESTEROL (test code = CHOL)  mg/dL          0-200            

 

                CHOLESTEROL/HDL RATIO (test code = CHOLHDL)  RATIO          0-4.

9            

 

                HDL CHOLESTEROL (test code = HDL)  mg/dL          40-60         

   

 

                LIPOPROTEIN LDL (test code = LDL)  mg/dL          100-129       

   





[id number] 12:07:00
--------------------------------------------------------------------------------
------------RUN DATE: 18                         [facility]           
              PAGE 1   RUN TIME: 1207                            Specimen Inqui
ry                    RUN USER: INTERFACE                                       
                   ------------------------------------------------------------
--------------------------------PATIENT: ALICEALFREDA              ACCT #: [id number]
[id number] LOC:  MERCEDES     U #: [id number]                                    
  AGE/SX: 73/M         ROOM:      [id number] DR:  Selvin Hannon MD
           :    43     BED:  A          DIS:                           
                    STATUS: ADM IN       TLOC:           --------------------
------------------------------------------------------------------------ SPEC #:
[id number]     RECD:      STATUS:  NINOSKA           REALEXANDR #: [id number]
[id number]                           TORRES:      [facility] DR: Kiko Crandall
[place]        ENTERED:      SP TYPE: CALCULI        OTHR DR: Rashaun Mendoza MD, Herbert LeonardORDERED:  GROSS                                       
                                      COPIES TO:   Rashaun Wyman MD  
[address] [address]   [phone]    Edilson Crandall   [address]   [place], [place] [id number] [phone] PROCEDURES
: GROSS () TISSUES:           LEFT KIDNEY - STONE         CLINICAL 
HISTORY    COLLECTION DATE: 18       LEFT URETERAL STONE         FINAL DIAG
NOSIS    Left kidney stone fragment, removal:        CALCULOUS FOR GROSS IDENTIF
ICATION ONLY       JIM/sm   D   [id number]           MACROSCOPIC    The specimen is re
ceived fresh labeled with the patient's name and identified   as "L kidney stone
fragment".  It consists of a single tan irregular stone   measuring 0.3 cm in g
reatest diameter.  The specimen will be sent for stone   analysis.         GROSS
PERFORMED AT [facility]                                    ** CONTINUED 
ON NEXT PAGE ** ---------------------------------------------------------------
-----------------------------RUN DATE: 18                         [facility]                          PAGE 2   RUN TIME: 1207                          
 Specimen Inquiry                    RUN USER: INTERFACE                        
                                  -------------------------------------------
-------------------------------------------------SPEC #: [id number]    PATIE
NT: ALFREDA JENKINS               #[id number]  (Continued)-------------------
-------------------------------------------------------------------------       
   MACROSCOPIC             (Continued)    [facility]   [address] [place] [address]   [phone])[phone]------------------------------------
-------------------------------------------------------- Signed SIGNATURE ON Tracie Villalobos 18 1200  --------------------
------------------------------------------------------------------------        
                                                                  ** END OF REP
ORT **

## 2020-05-11 VITALS — DIASTOLIC BLOOD PRESSURE: 63 MMHG | SYSTOLIC BLOOD PRESSURE: 110 MMHG

## 2020-05-11 VITALS — DIASTOLIC BLOOD PRESSURE: 93 MMHG | SYSTOLIC BLOOD PRESSURE: 114 MMHG

## 2020-05-11 VITALS — DIASTOLIC BLOOD PRESSURE: 88 MMHG | SYSTOLIC BLOOD PRESSURE: 122 MMHG

## 2020-05-11 VITALS — DIASTOLIC BLOOD PRESSURE: 79 MMHG | SYSTOLIC BLOOD PRESSURE: 112 MMHG

## 2020-05-11 VITALS — SYSTOLIC BLOOD PRESSURE: 108 MMHG | DIASTOLIC BLOOD PRESSURE: 76 MMHG

## 2020-05-11 VITALS — SYSTOLIC BLOOD PRESSURE: 103 MMHG | DIASTOLIC BLOOD PRESSURE: 65 MMHG

## 2020-05-11 VITALS — SYSTOLIC BLOOD PRESSURE: 114 MMHG | DIASTOLIC BLOOD PRESSURE: 93 MMHG

## 2020-05-11 LAB
ALBUMIN SERPL-MCNC: 2.9 G/DL (ref 3.5–5)
ALBUMIN/GLOB SERPL: 0.9 {RATIO} (ref 0.8–2)
ALP SERPL-CCNC: 81 IU/L (ref 40–150)
ALT SERPL-CCNC: 13 IU/L (ref 0–55)
ANION GAP SERPL CALC-SCNC: 13 MMOL/L (ref 8–16)
BASOPHILS # BLD AUTO: 0 10*3/UL (ref 0–0.1)
BASOPHILS NFR BLD AUTO: 0.3 % (ref 0–1)
BUN SERPL-MCNC: 20 MG/DL (ref 7–26)
BUN/CREAT SERPL: 17 (ref 6–25)
CALCIUM SERPL-MCNC: 8.3 MG/DL (ref 8.4–10.2)
CHLORIDE SERPL-SCNC: 111 MMOL/L (ref 98–107)
CO2 SERPL-SCNC: 22 MMOL/L (ref 22–29)
DEPRECATED NEUTROPHILS # BLD AUTO: 11.8 10*3/UL (ref 2.1–6.9)
EGFRCR SERPLBLD CKD-EPI 2021: 60 ML/MIN (ref 60–?)
EOSINOPHIL # BLD AUTO: 0 10*3/UL (ref 0–0.4)
EOSINOPHIL NFR BLD AUTO: 0 % (ref 0–6)
ERYTHROCYTE [DISTWIDTH] IN CORD BLOOD: 23.2 % (ref 11.7–14.4)
GLOBULIN PLAS-MCNC: 3.1 G/DL (ref 2.3–3.5)
GLUCOSE SERPLBLD-MCNC: 107 MG/DL (ref 74–118)
HCT VFR BLD AUTO: 42.4 % (ref 38.2–49.6)
HGB BLD-MCNC: 12.9 G/DL (ref 14–18)
LYMPHOCYTES # BLD: 1.2 10*3/UL (ref 1–3.2)
LYMPHOCYTES NFR BLD AUTO: 9 % (ref 18–39.1)
MCH RBC QN AUTO: 24.8 PG (ref 28–32)
MCHC RBC AUTO-ENTMCNC: 30.4 G/DL (ref 31–35)
MCV RBC AUTO: 81.5 FL (ref 81–99)
MONOCYTES # BLD AUTO: 0.4 10*3/UL (ref 0.2–0.8)
MONOCYTES NFR BLD AUTO: 2.8 % (ref 4.4–11.3)
NEUTS SEG NFR BLD AUTO: 86.4 % (ref 38.7–80)
PLATELET # BLD AUTO: 235 X10E3/UL (ref 140–360)
POTASSIUM SERPL-SCNC: 4 MMOL/L (ref 3.5–5.1)
RBC # BLD AUTO: 5.2 X10E6/UL (ref 4.3–5.7)
SODIUM SERPL-SCNC: 142 MMOL/L (ref 136–145)

## 2020-05-11 RX ADMIN — GUAIFENESIN AND DEXTROMETHORPHAN HYDROBROMIDE SCH EACH: 600; 30 TABLET, EXTENDED RELEASE ORAL at 15:00

## 2020-05-11 RX ADMIN — GUAIFENESIN AND DEXTROMETHORPHAN HYDROBROMIDE SCH EACH: 600; 30 TABLET, EXTENDED RELEASE ORAL at 08:25

## 2020-05-11 RX ADMIN — Medication SCH MG: at 13:55

## 2020-05-11 RX ADMIN — MUPIROCIN SCH GM: 20 OINTMENT TOPICAL at 21:38

## 2020-05-11 RX ADMIN — FUROSEMIDE SCH MG: 20 TABLET ORAL at 08:25

## 2020-05-11 RX ADMIN — Medication SCH MG: at 21:38

## 2020-05-11 RX ADMIN — AMIODARONE HYDROCHLORIDE SCH MG: 200 TABLET ORAL at 13:55

## 2020-05-11 RX ADMIN — Medication SCH MG: at 08:24

## 2020-05-11 RX ADMIN — FAMOTIDINE SCH MG: 20 TABLET, FILM COATED ORAL at 22:15

## 2020-05-11 RX ADMIN — TAMSULOSIN HYDROCHLORIDE SCH MG: 0.4 CAPSULE ORAL at 17:22

## 2020-05-11 RX ADMIN — PANTOPRAZOLE SODIUM SCH MG: 40 TABLET, DELAYED RELEASE ORAL at 06:05

## 2020-05-11 RX ADMIN — CEFEPIME SCH MLS/HR: 1 INJECTION, SOLUTION INTRAVENOUS at 16:15

## 2020-05-11 RX ADMIN — CARVEDILOL SCH MG: 3.12 TABLET, FILM COATED ORAL at 08:24

## 2020-05-11 RX ADMIN — VANCOMYCIN HYDROCHLORIDE SCH MLS/HR: 1 INJECTION, SOLUTION INTRAVENOUS at 21:38

## 2020-05-11 RX ADMIN — VANCOMYCIN HYDROCHLORIDE SCH MLS/HR: 1 INJECTION, SOLUTION INTRAVENOUS at 08:24

## 2020-05-11 RX ADMIN — CARVEDILOL SCH MG: 3.12 TABLET, FILM COATED ORAL at 21:38

## 2020-05-11 RX ADMIN — AMIODARONE HYDROCHLORIDE SCH MG: 200 TABLET ORAL at 04:50

## 2020-05-11 RX ADMIN — MUPIROCIN SCH GM: 20 OINTMENT TOPICAL at 15:00

## 2020-05-11 RX ADMIN — CEFEPIME SCH MLS/HR: 1 INJECTION, SOLUTION INTRAVENOUS at 04:45

## 2020-05-11 RX ADMIN — MUPIROCIN SCH GM: 20 OINTMENT TOPICAL at 08:25

## 2020-05-11 RX ADMIN — CLOPIDOGREL BISULFATE SCH MG: 75 TABLET, FILM COATED ORAL at 08:25

## 2020-05-11 RX ADMIN — Medication SCH MG: at 08:25

## 2020-05-11 RX ADMIN — GUAIFENESIN AND DEXTROMETHORPHAN HYDROBROMIDE SCH EACH: 600; 30 TABLET, EXTENDED RELEASE ORAL at 21:38

## 2020-05-11 RX ADMIN — AMIODARONE HYDROCHLORIDE SCH MG: 200 TABLET ORAL at 22:15

## 2020-05-11 RX ADMIN — AMIODARONE HYDROCHLORIDE SCH MG: 200 TABLET ORAL at 06:05

## 2020-05-11 NOTE — NUR
RECEIVED REPORT FROM 7AM NURSE, PATIENT RESTING IN BED, NO DISTRESS NOTED. CALL LIGHT IN 
REACH. WILL CONTINUE TO MONITOR.

## 2020-05-11 NOTE — HISTORY AND PHYSICAL
REASON FOR ADMISSION:  The patient is a 74-year-old gentleman, who comes into the

hospital with failed outpatient treatment of cellulitis and lymphangitis of the right

upper extremity. 

 

HISTORY OF PRESENT ILLNESS:  Mr. Matteo Umana who developed a cellulitis and

paronychia of his right finger.  The patient had some lymphangitis extending up to the

upper arm when seen on video-conference with the patient and he was started on

clindamycin.  The patient's cellulitis and lymphangitis progressed onto the right upper

extremity and was sent from the emergency room to the hospital for further care and

evaluation of the cellulitis. 

 

PAST MEDICAL HISTORY:  History of atrial fibrillation, history of hyperlipidemia,

history of coronary artery disease, history of congestive heart failure with preserved

ejection fraction, history of reflux esophagitis, and history of benign prostatic

hypertrophy. 

 

MEDICATIONS:  He takes at home:

1. Acetaminophen as needed.

2. Albuterol for his COPD q.6 hours p.r.n.

3. Aspirin 325 mg.

4. Atorvastatin 20 mg, total of 80 mg at nighttime.

5. Carvedilol 3.125 two tablets twice a day.

6. Clindamycin, which was recently started.

7. Clopidogrel 75 mg.

8. Famotidine 20 mg.

9. Lasix 20 mg daily.

10. Gabapentin 200 mg q.12 hours.

11. Guaifenesin as needed.

12. Meclizine 12.5 mg.

13. Melatonin 3 mg.

14. Naprosyn t.i.d. p.r.n.

15. Pantoprazole 40 mg daily.

16. Prednisone q.48 hours.

17. Tamsulosin 0.4 mg daily. 

The patient also has polymyalgia rheumatica, which he takes prednisone for.

 

REVIEW OF SYSTEMS:

Negative for chest pain.  No shortness of breath.  No nausea, vomiting, or diarrhea.  No

constipation.  No rectal bleeding.  No hematochezia.  No hematemesis.  The patient is

wheelchair bound, again secondary to PMR and also lower extremity weakness. 

 

ALLERGIES:  THE PATIENT IS ALLERGIC TO SULFA, PENICILLINS, CELEBREX, AND TETRACYCLINE.

 

FAMILY HISTORY:  Noncontributory.

 

PAST SURGICAL HISTORY:  Noncontributory.

 

PHYSICAL EXAMINATION:

VITAL SIGNS:  Temperature is 97.8, pulse of 72, respirations of 18, blood pressure is

122/88, pulse oximetry of 96% on room air. 

HEENT:  Normocephalic, atraumatic.  Pupils are reactive. 

CVS:  S1 and S2 normal.  Regular rate and rhythm. 

ABDOMEN:  Nontender, nondistended. 

EXTREMITIES:  No clubbing, no cyanosis, no edema.  Right upper extremity with tenderness

in the middle finger with extension of lymphangitis into forearm and arm.  NEUROLOGIC:

Stable.  No decreased strength in the lower extremities and shoulder and pelvic girdle. 

IMAGING STUDIES:  The finger was x-rayed, shows no acute osseous abnormality.  Soft

tissue swelling is noted. 

 

ASSESSMENT:  Mr. Matteo Umana is with:

1. Cellulitis of the upper finger with lymphangitic spread, cellulitis of the forearm

and the arm. 

2. History of chronic obstructive pulmonary disease.

3. History of coronary artery disease.

4. Hypertension.

5. Hyperlipidemia.

6. Polymyalgia rheumatica.

7. Debility.

 

PLAN:  

1. The patient has been put on cefepime and vancomycin.  Vancomycin has been started 1 g

q.24 hours in lieu of his kidney functions.  Cefepime is q.12 hours 1 g. 

2. We will continue his all home medications for his other comorbid conditions.

3. Monitor white count.  Last white count, when he came was 14.14, today is 13.67.

Chemistry shows sodium of 142, BUN of 21, creatinine of 1.42. 

4. Additional diagnosis of acute kidney injury, which is improved by hydration.

5. Further recommendation per clinical course.  We will continue to monitor the patient.

 We 

will change his vancomycin to q.12 hours and also do a one vancomycin trough on the

third dose.  Further recommendation per clinical course. 

 

 

 

 

______________________________

MD VINCENT Molina/MODL

D:  05/11/2020 07:01:49

T:  05/11/2020 09:06:22

Job #:  [id number]

## 2020-05-11 NOTE — NUR
PATIENT IS AWAKE AND IN STABLE CONDITION WITH NO S/S OF RESPIRATORY DISTRESS. NO PAIN 
VOICED. TELEMETRY APPLIED. WHEELCHAIR PROVIDED TO PATIENT IN ROOM. CALL LIGHT IS WITHIN 
REACH, PATIENT INSTRUCTED TO CALL FOR ASSISTANCE AS NEEDED.

## 2020-05-12 VITALS — DIASTOLIC BLOOD PRESSURE: 87 MMHG | SYSTOLIC BLOOD PRESSURE: 126 MMHG

## 2020-05-12 VITALS — DIASTOLIC BLOOD PRESSURE: 74 MMHG | SYSTOLIC BLOOD PRESSURE: 104 MMHG

## 2020-05-12 VITALS — DIASTOLIC BLOOD PRESSURE: 87 MMHG | SYSTOLIC BLOOD PRESSURE: 125 MMHG

## 2020-05-12 VITALS — DIASTOLIC BLOOD PRESSURE: 81 MMHG | SYSTOLIC BLOOD PRESSURE: 111 MMHG

## 2020-05-12 VITALS — DIASTOLIC BLOOD PRESSURE: 74 MMHG | SYSTOLIC BLOOD PRESSURE: 102 MMHG

## 2020-05-12 VITALS — SYSTOLIC BLOOD PRESSURE: 127 MMHG | DIASTOLIC BLOOD PRESSURE: 94 MMHG

## 2020-05-12 VITALS — SYSTOLIC BLOOD PRESSURE: 104 MMHG | DIASTOLIC BLOOD PRESSURE: 74 MMHG

## 2020-05-12 VITALS — SYSTOLIC BLOOD PRESSURE: 126 MMHG | DIASTOLIC BLOOD PRESSURE: 87 MMHG

## 2020-05-12 VITALS — SYSTOLIC BLOOD PRESSURE: 125 MMHG | DIASTOLIC BLOOD PRESSURE: 87 MMHG

## 2020-05-12 LAB
ANION GAP SERPL CALC-SCNC: 14.9 MMOL/L (ref 8–16)
BASOPHILS # BLD AUTO: 0.1 10*3/UL (ref 0–0.1)
BASOPHILS NFR BLD AUTO: 0.6 % (ref 0–1)
BUN SERPL-MCNC: 21 MG/DL (ref 7–26)
BUN/CREAT SERPL: 16 (ref 6–25)
CALCIUM SERPL-MCNC: 8.6 MG/DL (ref 8.4–10.2)
CHLORIDE SERPL-SCNC: 112 MMOL/L (ref 98–107)
CO2 SERPL-SCNC: 19 MMOL/L (ref 22–29)
DEPRECATED NEUTROPHILS # BLD AUTO: 11.9 10*3/UL (ref 2.1–6.9)
EGFRCR SERPLBLD CKD-EPI 2021: 53 ML/MIN (ref 60–?)
EOSINOPHIL # BLD AUTO: 0.2 10*3/UL (ref 0–0.4)
EOSINOPHIL NFR BLD AUTO: 1.5 % (ref 0–6)
ERYTHROCYTE [DISTWIDTH] IN CORD BLOOD: 23.2 % (ref 11.7–14.4)
GLUCOSE SERPLBLD-MCNC: 95 MG/DL (ref 74–118)
HCT VFR BLD AUTO: 46.2 % (ref 38.2–49.6)
HGB BLD-MCNC: 13.9 G/DL (ref 14–18)
LYMPHOCYTES # BLD: 2.3 10*3/UL (ref 1–3.2)
LYMPHOCYTES NFR BLD AUTO: 14.8 % (ref 18–39.1)
MCH RBC QN AUTO: 25.2 PG (ref 28–32)
MCHC RBC AUTO-ENTMCNC: 30.1 G/DL (ref 31–35)
MCV RBC AUTO: 83.7 FL (ref 81–99)
MONOCYTES # BLD AUTO: 0.8 10*3/UL (ref 0.2–0.8)
MONOCYTES NFR BLD AUTO: 5.1 % (ref 4.4–11.3)
NEUTS SEG NFR BLD AUTO: 76.3 % (ref 38.7–80)
PLATELET # BLD AUTO: 253 X10E3/UL (ref 140–360)
POTASSIUM SERPL-SCNC: 3.9 MMOL/L (ref 3.5–5.1)
RBC # BLD AUTO: 5.52 X10E6/UL (ref 4.3–5.7)
SODIUM SERPL-SCNC: 142 MMOL/L (ref 136–145)

## 2020-05-12 RX ADMIN — TAMSULOSIN HYDROCHLORIDE SCH MG: 0.4 CAPSULE ORAL at 16:40

## 2020-05-12 RX ADMIN — Medication SCH MG: at 08:58

## 2020-05-12 RX ADMIN — GUAIFENESIN AND DEXTROMETHORPHAN HYDROBROMIDE SCH EACH: 600; 30 TABLET, EXTENDED RELEASE ORAL at 08:57

## 2020-05-12 RX ADMIN — MUPIROCIN SCH GM: 20 OINTMENT TOPICAL at 16:41

## 2020-05-12 RX ADMIN — PREDNISONE SCH MG: 5 TABLET ORAL at 20:30

## 2020-05-12 RX ADMIN — CIPROFLOXACIN SCH MLS/HR: 2 INJECTION, SOLUTION INTRAVENOUS at 21:17

## 2020-05-12 RX ADMIN — Medication SCH MG: at 20:30

## 2020-05-12 RX ADMIN — AMIODARONE HYDROCHLORIDE SCH MG: 200 TABLET ORAL at 14:34

## 2020-05-12 RX ADMIN — Medication SCH MG: at 08:57

## 2020-05-12 RX ADMIN — CARVEDILOL SCH MG: 3.12 TABLET, FILM COATED ORAL at 08:58

## 2020-05-12 RX ADMIN — CLINDAMYCIN PHOSPHATE SCH MLS/HR: 6 INJECTION, SOLUTION INTRAVENOUS at 14:34

## 2020-05-12 RX ADMIN — Medication SCH MG: at 14:34

## 2020-05-12 RX ADMIN — GUAIFENESIN AND DEXTROMETHORPHAN HYDROBROMIDE SCH EACH: 600; 30 TABLET, EXTENDED RELEASE ORAL at 15:00

## 2020-05-12 RX ADMIN — MUPIROCIN SCH GM: 20 OINTMENT TOPICAL at 09:00

## 2020-05-12 RX ADMIN — CLINDAMYCIN PHOSPHATE SCH MLS/HR: 6 INJECTION, SOLUTION INTRAVENOUS at 08:51

## 2020-05-12 RX ADMIN — CLINDAMYCIN PHOSPHATE SCH MLS/HR: 6 INJECTION, SOLUTION INTRAVENOUS at 20:30

## 2020-05-12 RX ADMIN — PANTOPRAZOLE SODIUM SCH MG: 40 TABLET, DELAYED RELEASE ORAL at 05:45

## 2020-05-12 RX ADMIN — CIPROFLOXACIN SCH MLS/HR: 2 INJECTION, SOLUTION INTRAVENOUS at 10:19

## 2020-05-12 RX ADMIN — CLOPIDOGREL BISULFATE SCH MG: 75 TABLET, FILM COATED ORAL at 08:58

## 2020-05-12 RX ADMIN — GUAIFENESIN AND DEXTROMETHORPHAN HYDROBROMIDE SCH EACH: 600; 30 TABLET, EXTENDED RELEASE ORAL at 20:30

## 2020-05-12 RX ADMIN — MUPIROCIN SCH GM: 20 OINTMENT TOPICAL at 20:30

## 2020-05-12 RX ADMIN — CEFEPIME SCH MLS/HR: 1 INJECTION, SOLUTION INTRAVENOUS at 05:45

## 2020-05-12 RX ADMIN — AMIODARONE HYDROCHLORIDE SCH MG: 200 TABLET ORAL at 05:45

## 2020-05-12 RX ADMIN — FUROSEMIDE SCH MG: 20 TABLET ORAL at 08:57

## 2020-05-12 RX ADMIN — FAMOTIDINE SCH MG: 20 TABLET, FILM COATED ORAL at 20:30

## 2020-05-12 RX ADMIN — CARVEDILOL SCH MG: 3.12 TABLET, FILM COATED ORAL at 18:54

## 2020-05-12 RX ADMIN — MUPIROCIN SCH GM: 20 OINTMENT TOPICAL at 05:45

## 2020-05-12 RX ADMIN — ZOLPIDEM TARTRATE PRN MG: 5 TABLET, FILM COATED ORAL at 20:30

## 2020-05-12 RX ADMIN — AMIODARONE HYDROCHLORIDE SCH MG: 200 TABLET ORAL at 21:17

## 2020-05-12 NOTE — NUR
PATIENT RESTING IN BED IN STABLE CONDITION AOX4, NO SIGNS OF DISTRESS NOTED. REDNESS NOTED 
ON RIGHT INDEX FINGER AND PATIENT VOICES NO PAIN AT THIS TIME. IV ANTIBIOTICS ARE RUNNING AT 
ORDERED RATE. BED IS IN LOWEST POSITION, BOTH SIDE RAILS ARE UP, CALL LIGHT IS WITHIN EASY 
REACH, WILL CONTINUE TO MONITOR.

## 2020-05-12 NOTE — PROGRESS NOTE
DATE:    

 

SUBJECTIVE:  The patient is a 74-year-old male, who came in with lymphangitis and

cellulitis of the upper extremity.  The patient also complains of pain and erythema to

the area.  Tenderness is not increased, but still present and unable to bend his

fingers.  Lymphangitic spread to the forearm is evident. 

 

OBJECTIVE:  VITAL SIGNS:  Temperature is 97.4, pulse 79, respirations 22, blood pressure

is 127/94, pulse oximetry of 94% on room air. 

HEENT:  Normocephalic and atraumatic.  Pupils are reactive to light and accommodation. 

CVS:  S1 and S2 normal. 

ABDOMEN:  Nontender and nondistended. 

EXTREMITIES:  Upper extremity with lymphangitic spread and redness in the index finger

with erythema and inability to bend his distal interphalangeal and proximal

interphalangeal joints, redness and erythema and lymphangitis spreading to the forearm

and the arm and tenderness present in that area too. 

 

LABORATORY VALUES:  The patient's white count has gone up to 15,000, hemoglobin of 13.9,

hematocrit of 46.2.  Chemistry is pending.  Last creatinine was 1.18. 

 

ASSESSMENT:  Mr. Matteo Umana with:

1. Possibly a cellulitis and lymphangitis of the finger with spread to the forearm and

arm. 

2. Apparent presence of cat in the house and in the apartment also, possible nick or

bite. 

3. History of chronic obstructive pulmonary disorder.

4. History of coronary artery disease.

5. Hypertension.

6. Polymyalgia rheumatica.

7. Debility.

 

PLAN:  

1. Since the white count is going up, we will go ahead and change his vancomycin,

cefepime out to Cipro and clindamycin.  Clindamycin IV q.6 hours and Cipro twice a day. 

2. Monitor his white count, seems to be increasing.  The patient will need IV

antibiotics and inpatient admission to prevent sepsis. 

3. Acute kidney injury, which has improved with hydration.

4. We will follow up with his creatinine levels.

5. Chronic conditions.  Continue all home medications and also with his medication for

CAD and congestive heart failure.  Further recommendation per clinical course.  He has

chronic diastolic heart failure. 

 

 

 

 

______________________________

MD VINCENT Molina/JOON

D:  05/12/2020 06:47:24

T:  05/12/2020 09:06:41

Job #:  [id number]

## 2020-05-12 NOTE — NUR
Pt unavailable at this time. Pt's nurse at bedside. Will follow up as able. 



[facility]



Spiritual Care Department

O: [phone]

## 2020-05-13 VITALS — SYSTOLIC BLOOD PRESSURE: 106 MMHG | DIASTOLIC BLOOD PRESSURE: 75 MMHG

## 2020-05-13 VITALS — DIASTOLIC BLOOD PRESSURE: 76 MMHG | SYSTOLIC BLOOD PRESSURE: 107 MMHG

## 2020-05-13 VITALS — SYSTOLIC BLOOD PRESSURE: 115 MMHG | DIASTOLIC BLOOD PRESSURE: 90 MMHG

## 2020-05-13 VITALS — SYSTOLIC BLOOD PRESSURE: 106 MMHG | DIASTOLIC BLOOD PRESSURE: 79 MMHG

## 2020-05-13 VITALS — DIASTOLIC BLOOD PRESSURE: 74 MMHG | SYSTOLIC BLOOD PRESSURE: 107 MMHG

## 2020-05-13 VITALS — DIASTOLIC BLOOD PRESSURE: 67 MMHG | SYSTOLIC BLOOD PRESSURE: 99 MMHG

## 2020-05-13 LAB
ANION GAP SERPL CALC-SCNC: 11.9 MMOL/L (ref 8–16)
BASOPHILS # BLD AUTO: 0.1 10*3/UL (ref 0–0.1)
BASOPHILS NFR BLD AUTO: 0.6 % (ref 0–1)
BUN SERPL-MCNC: 17 MG/DL (ref 7–26)
BUN/CREAT SERPL: 16 (ref 6–25)
CALCIUM SERPL-MCNC: 7.8 MG/DL (ref 8.4–10.2)
CHLORIDE SERPL-SCNC: 109 MMOL/L (ref 98–107)
CO2 SERPL-SCNC: 21 MMOL/L (ref 22–29)
DEPRECATED NEUTROPHILS # BLD AUTO: 9.8 10*3/UL (ref 2.1–6.9)
EGFRCR SERPLBLD CKD-EPI 2021: > 60 ML/MIN (ref 60–?)
EOSINOPHIL # BLD AUTO: 0.2 10*3/UL (ref 0–0.4)
EOSINOPHIL NFR BLD AUTO: 1.2 % (ref 0–6)
ERYTHROCYTE [DISTWIDTH] IN CORD BLOOD: 23.1 % (ref 11.7–14.4)
GLUCOSE SERPLBLD-MCNC: 89 MG/DL (ref 74–118)
HCT VFR BLD AUTO: 43.7 % (ref 38.2–49.6)
HGB BLD-MCNC: 13.4 G/DL (ref 14–18)
LYMPHOCYTES # BLD: 1.4 10*3/UL (ref 1–3.2)
LYMPHOCYTES NFR BLD AUTO: 11.3 % (ref 18–39.1)
MCH RBC QN AUTO: 25 PG (ref 28–32)
MCHC RBC AUTO-ENTMCNC: 30.7 G/DL (ref 31–35)
MCV RBC AUTO: 81.4 FL (ref 81–99)
MONOCYTES # BLD AUTO: 0.6 10*3/UL (ref 0.2–0.8)
MONOCYTES NFR BLD AUTO: 4.5 % (ref 4.4–11.3)
NEUTS SEG NFR BLD AUTO: 80.9 % (ref 38.7–80)
PLATELET # BLD AUTO: 231 X10E3/UL (ref 140–360)
POTASSIUM SERPL-SCNC: 3.9 MMOL/L (ref 3.5–5.1)
RBC # BLD AUTO: 5.37 X10E6/UL (ref 4.3–5.7)
SODIUM SERPL-SCNC: 138 MMOL/L (ref 136–145)

## 2020-05-13 RX ADMIN — MUPIROCIN SCH GM: 20 OINTMENT TOPICAL at 14:16

## 2020-05-13 RX ADMIN — TAMSULOSIN HYDROCHLORIDE SCH MG: 0.4 CAPSULE ORAL at 18:14

## 2020-05-13 RX ADMIN — MUPIROCIN SCH GM: 20 OINTMENT TOPICAL at 20:22

## 2020-05-13 RX ADMIN — FUROSEMIDE SCH MG: 20 TABLET ORAL at 10:04

## 2020-05-13 RX ADMIN — Medication PRN ML: at 20:00

## 2020-05-13 RX ADMIN — Medication SCH MG: at 10:04

## 2020-05-13 RX ADMIN — GUAIFENESIN AND DEXTROMETHORPHAN HYDROBROMIDE SCH EACH: 600; 30 TABLET, EXTENDED RELEASE ORAL at 14:16

## 2020-05-13 RX ADMIN — Medication SCH ML: at 11:15

## 2020-05-13 RX ADMIN — CLINDAMYCIN PHOSPHATE SCH MLS/HR: 6 INJECTION, SOLUTION INTRAVENOUS at 01:51

## 2020-05-13 RX ADMIN — AMIODARONE HYDROCHLORIDE SCH MG: 200 TABLET ORAL at 05:53

## 2020-05-13 RX ADMIN — Medication SCH MG: at 20:22

## 2020-05-13 RX ADMIN — Medication SCH ML: at 20:00

## 2020-05-13 RX ADMIN — FAMOTIDINE SCH MG: 20 TABLET, FILM COATED ORAL at 20:22

## 2020-05-13 RX ADMIN — CARVEDILOL SCH MG: 3.12 TABLET, FILM COATED ORAL at 10:04

## 2020-05-13 RX ADMIN — CLINDAMYCIN PHOSPHATE SCH MLS/HR: 6 INJECTION, SOLUTION INTRAVENOUS at 20:22

## 2020-05-13 RX ADMIN — ZOLPIDEM TARTRATE PRN MG: 5 TABLET, FILM COATED ORAL at 20:23

## 2020-05-13 RX ADMIN — CLOPIDOGREL BISULFATE SCH MG: 75 TABLET, FILM COATED ORAL at 10:04

## 2020-05-13 RX ADMIN — MUPIROCIN SCH GM: 20 OINTMENT TOPICAL at 11:27

## 2020-05-13 RX ADMIN — Medication SCH MG: at 13:49

## 2020-05-13 RX ADMIN — AMIODARONE HYDROCHLORIDE SCH MG: 200 TABLET ORAL at 21:08

## 2020-05-13 RX ADMIN — PANTOPRAZOLE SODIUM SCH MG: 40 TABLET, DELAYED RELEASE ORAL at 05:53

## 2020-05-13 RX ADMIN — Medication PRN ML: at 11:15

## 2020-05-13 RX ADMIN — CIPROFLOXACIN SCH MLS/HR: 2 INJECTION, SOLUTION INTRAVENOUS at 11:22

## 2020-05-13 RX ADMIN — GUAIFENESIN AND DEXTROMETHORPHAN HYDROBROMIDE SCH EACH: 600; 30 TABLET, EXTENDED RELEASE ORAL at 10:04

## 2020-05-13 RX ADMIN — CLINDAMYCIN PHOSPHATE SCH MLS/HR: 6 INJECTION, SOLUTION INTRAVENOUS at 13:49

## 2020-05-13 RX ADMIN — CIPROFLOXACIN SCH MLS/HR: 2 INJECTION, SOLUTION INTRAVENOUS at 21:08

## 2020-05-13 RX ADMIN — CARVEDILOL SCH MG: 3.12 TABLET, FILM COATED ORAL at 18:14

## 2020-05-13 RX ADMIN — GUAIFENESIN AND DEXTROMETHORPHAN HYDROBROMIDE SCH EACH: 600; 30 TABLET, EXTENDED RELEASE ORAL at 20:22

## 2020-05-13 RX ADMIN — AMIODARONE HYDROCHLORIDE SCH MG: 200 TABLET ORAL at 13:49

## 2020-05-13 RX ADMIN — CLINDAMYCIN PHOSPHATE SCH MLS/HR: 6 INJECTION, SOLUTION INTRAVENOUS at 10:04

## 2020-05-13 NOTE — PROGRESS NOTE
DATE:    

 

SUBJECTIVE:  This is a 74-year-old male that came in with cellulitis of the right finger

with lymphangitis.  The patient is currently doing better.  No chest pain.  No shortness

of breath.  The right index finger is less red, but still has spreading lymphangitis to

the forearm and arm.  No chest pain or shortness of breath, does want a breathing

treatment according to him. 

 

OBJECTIVE:  VITAL SIGNS:  Temperature is 98.4, pulse of 96, respiration is 20, blood

pressure is on lower side of 99/67. 

HEENT:  Normocephalic and atraumatic.  Pupils are reactive. 

CVS:  Irregular. 

ABDOMEN:  Nontender, nondistended.  Right side index finger with lymphangitis and

cellulitis spreading onto the forearm and arm. 

EXTREMITIES:  No clubbing, no cyanosis, no edema.

 

LABORATORY VALUES:  White count has come down to 12,000 down from 15,000 hemoglobin of

13.4, hematocrit 43, neutrophil count none has also trended down with a change of

antibiotic.  Chemistries; sodium 138, potassium 3.9.  Microbiology, no growth to the

blood cultures. 

 

ASSESSMENT:  Mr. Dong Felipe is a 74-year-old gentleman with:

1. Cellulitis and lymphangitis of the finger with spread to the forearm.

2. Cat bite.

3. Chronic obstructive pulmonary disease.

4. Coronary artery disease.

5. Hypertension.

6. Polymyalgia rheumatica.

7. Debility.

 

PLAN:  Continue on clindamycin and Cipro.  White count is trending down.  Acute kidney

injury is better, chronic conditions, continue medications and also start back on his

albuterol and Atrovent treatments as needed.  Further recommendation per clinical

course.  Disposition will be 1-2 days depending on the regression of the disease. 

 

 

 

 

______________________________

MD MARINA MolinaJ/MODL

D:  05/13/2020 07:01:39

T:  05/13/2020 08:01:37

Job #:  [id number]

## 2020-05-13 NOTE — NUR
received bedside report. pt is alert sitting up in bed, no s/s of distress, no complaints. 
call light within reach and instructed pt to call RN for help

## 2020-05-14 VITALS — DIASTOLIC BLOOD PRESSURE: 77 MMHG | SYSTOLIC BLOOD PRESSURE: 116 MMHG

## 2020-05-14 VITALS — SYSTOLIC BLOOD PRESSURE: 107 MMHG | DIASTOLIC BLOOD PRESSURE: 67 MMHG

## 2020-05-14 VITALS — DIASTOLIC BLOOD PRESSURE: 71 MMHG | SYSTOLIC BLOOD PRESSURE: 99 MMHG

## 2020-05-14 VITALS — DIASTOLIC BLOOD PRESSURE: 68 MMHG | SYSTOLIC BLOOD PRESSURE: 106 MMHG

## 2020-05-14 VITALS — DIASTOLIC BLOOD PRESSURE: 70 MMHG | SYSTOLIC BLOOD PRESSURE: 99 MMHG

## 2020-05-14 VITALS — SYSTOLIC BLOOD PRESSURE: 102 MMHG | DIASTOLIC BLOOD PRESSURE: 63 MMHG

## 2020-05-14 RX ADMIN — AMIODARONE HYDROCHLORIDE SCH MG: 200 TABLET ORAL at 06:00

## 2020-05-14 RX ADMIN — GUAIFENESIN AND DEXTROMETHORPHAN HYDROBROMIDE SCH EACH: 600; 30 TABLET, EXTENDED RELEASE ORAL at 14:54

## 2020-05-14 RX ADMIN — CLINDAMYCIN PHOSPHATE SCH MLS/HR: 6 INJECTION, SOLUTION INTRAVENOUS at 21:13

## 2020-05-14 RX ADMIN — CLINDAMYCIN PHOSPHATE SCH MLS/HR: 6 INJECTION, SOLUTION INTRAVENOUS at 02:23

## 2020-05-14 RX ADMIN — MUPIROCIN SCH GM: 20 OINTMENT TOPICAL at 21:22

## 2020-05-14 RX ADMIN — PANTOPRAZOLE SODIUM SCH MG: 40 TABLET, DELAYED RELEASE ORAL at 06:00

## 2020-05-14 RX ADMIN — Medication SCH MG: at 21:23

## 2020-05-14 RX ADMIN — Medication SCH ML: at 07:30

## 2020-05-14 RX ADMIN — ZOLPIDEM TARTRATE PRN MG: 5 TABLET, FILM COATED ORAL at 21:16

## 2020-05-14 RX ADMIN — Medication SCH MG: at 08:40

## 2020-05-14 RX ADMIN — Medication SCH MG: at 14:54

## 2020-05-14 RX ADMIN — CLOPIDOGREL BISULFATE SCH MG: 75 TABLET, FILM COATED ORAL at 08:40

## 2020-05-14 RX ADMIN — MUPIROCIN SCH GM: 20 OINTMENT TOPICAL at 14:54

## 2020-05-14 RX ADMIN — MUPIROCIN SCH GM: 20 OINTMENT TOPICAL at 08:40

## 2020-05-14 RX ADMIN — Medication SCH ML: at 20:00

## 2020-05-14 RX ADMIN — CLINDAMYCIN PHOSPHATE SCH MLS/HR: 6 INJECTION, SOLUTION INTRAVENOUS at 11:15

## 2020-05-14 RX ADMIN — CARVEDILOL SCH MG: 3.12 TABLET, FILM COATED ORAL at 08:40

## 2020-05-14 RX ADMIN — CIPROFLOXACIN SCH MLS/HR: 2 INJECTION, SOLUTION INTRAVENOUS at 21:40

## 2020-05-14 RX ADMIN — TAMSULOSIN HYDROCHLORIDE SCH MG: 0.4 CAPSULE ORAL at 17:06

## 2020-05-14 RX ADMIN — FUROSEMIDE SCH MG: 20 TABLET ORAL at 08:40

## 2020-05-14 RX ADMIN — CIPROFLOXACIN SCH MLS/HR: 2 INJECTION, SOLUTION INTRAVENOUS at 12:40

## 2020-05-14 RX ADMIN — GUAIFENESIN AND DEXTROMETHORPHAN HYDROBROMIDE SCH EACH: 600; 30 TABLET, EXTENDED RELEASE ORAL at 08:40

## 2020-05-14 RX ADMIN — AMIODARONE HYDROCHLORIDE SCH MG: 200 TABLET ORAL at 21:23

## 2020-05-14 RX ADMIN — CARVEDILOL SCH MG: 3.12 TABLET, FILM COATED ORAL at 17:47

## 2020-05-14 RX ADMIN — PREDNISONE SCH MG: 5 TABLET ORAL at 21:13

## 2020-05-14 RX ADMIN — AMIODARONE HYDROCHLORIDE SCH MG: 200 TABLET ORAL at 14:54

## 2020-05-14 RX ADMIN — Medication PRN ML: at 20:00

## 2020-05-14 RX ADMIN — FAMOTIDINE SCH MG: 20 TABLET, FILM COATED ORAL at 21:22

## 2020-05-14 RX ADMIN — GUAIFENESIN AND DEXTROMETHORPHAN HYDROBROMIDE SCH EACH: 600; 30 TABLET, EXTENDED RELEASE ORAL at 21:23

## 2020-05-14 RX ADMIN — CLINDAMYCIN PHOSPHATE SCH MLS/HR: 6 INJECTION, SOLUTION INTRAVENOUS at 14:54

## 2020-05-14 NOTE — PROGRESS NOTE
DATE:    

 

SUBJECTIVE:  A 74-year-old gentleman with a history of cellulitis and lymphangitis in

the right index finger.  The patient is feeling a bit better.  The pain still continues

in joint, DIP, and PIP.  Lymphangitis spread has decreased.  The patient's finger is

looking better. 

 

OBJECTIVE:  VITAL SIGNS:  Temperature is 97.7, pulse of 79, respirations 18, blood

pressure is 99/70, pulse oximeter 94%. 

HEENT:  Normocephalic and atraumatic.  Pupils are reactive to light and accommodation. 

CVS:  S1 and S2 normal.  Regular rate and rhythm. 

ABDOMEN:  Nontender and nondistended. 

EXTREMITIES:  No clubbing.  No cyanosis.  Right index finger with lymphangitis, fiery

red still, but the lymphangitic spread is better.  Forearm spread noted.  Arm is much

decreased and dissipated. 

 

LABORATORY VALUES:  Hematology; white count is 12.13 yesterday.

 

ASSESSMENT:  Mr. Matteo Umana with:

1. Cellulitis and lymphocyte of the finger, better.

2. Cat bite.

3. Chronic obstructive pulmonary disorder.

4. Coronary artery disease.

5. Hypertension.

6. Polymyalgia rheumatica.

7. Debility.

 

PLAN:  Continue with Cipro and clindamycin.  The patient's white count is better.  Acute

kidney injury is better.  Plan to discharge tomorrow on oral clindamycin if lymphangitis

spread has decreased in area.  Further recommendation per clinical course.  We will

continue to monitor the patient and discharge will be to the assisted living place. 

 

 

 

 

______________________________

MD VINCENT Molina/MODL

D:  05/14/2020 07:04:32

T:  05/14/2020 07:50:23

Job #:  [id number]

## 2020-05-14 NOTE — NUR
RECEIVED BEDSIDE REPORT. PT IS ALERT RESTING IN BED, NO S/S OF DISTRESS. CALL LIGHT WITHIN 
REACH AND INSTRUCTED PT TO CALL RN FOR HELP

## 2020-05-14 NOTE — NUR
spoke with Alicia at [facility] [place], she requested that upon discharge to print an updated 
transfer MAR and have the assigned RN sign it.

## 2020-05-15 VITALS — DIASTOLIC BLOOD PRESSURE: 79 MMHG | SYSTOLIC BLOOD PRESSURE: 118 MMHG

## 2020-05-15 VITALS — DIASTOLIC BLOOD PRESSURE: 76 MMHG | SYSTOLIC BLOOD PRESSURE: 110 MMHG

## 2020-05-15 VITALS — SYSTOLIC BLOOD PRESSURE: 118 MMHG | DIASTOLIC BLOOD PRESSURE: 79 MMHG

## 2020-05-15 VITALS — DIASTOLIC BLOOD PRESSURE: 76 MMHG | SYSTOLIC BLOOD PRESSURE: 114 MMHG

## 2020-05-15 VITALS — SYSTOLIC BLOOD PRESSURE: 111 MMHG | DIASTOLIC BLOOD PRESSURE: 82 MMHG

## 2020-05-15 RX ADMIN — MUPIROCIN SCH GM: 20 OINTMENT TOPICAL at 08:49

## 2020-05-15 RX ADMIN — Medication SCH MG: at 08:49

## 2020-05-15 RX ADMIN — PANTOPRAZOLE SODIUM SCH MG: 40 TABLET, DELAYED RELEASE ORAL at 05:36

## 2020-05-15 RX ADMIN — AMIODARONE HYDROCHLORIDE SCH MG: 200 TABLET ORAL at 05:36

## 2020-05-15 RX ADMIN — CLOPIDOGREL BISULFATE SCH MG: 75 TABLET, FILM COATED ORAL at 08:49

## 2020-05-15 RX ADMIN — CLINDAMYCIN PHOSPHATE SCH MLS/HR: 6 INJECTION, SOLUTION INTRAVENOUS at 02:00

## 2020-05-15 RX ADMIN — CIPROFLOXACIN SCH MLS/HR: 2 INJECTION, SOLUTION INTRAVENOUS at 09:16

## 2020-05-15 RX ADMIN — CLINDAMYCIN PHOSPHATE SCH MLS/HR: 6 INJECTION, SOLUTION INTRAVENOUS at 08:45

## 2020-05-15 RX ADMIN — GUAIFENESIN AND DEXTROMETHORPHAN HYDROBROMIDE SCH EACH: 600; 30 TABLET, EXTENDED RELEASE ORAL at 08:49

## 2020-05-15 RX ADMIN — CARVEDILOL SCH MG: 3.12 TABLET, FILM COATED ORAL at 08:45

## 2020-05-15 RX ADMIN — Medication SCH ML: at 06:20

## 2020-05-15 RX ADMIN — FUROSEMIDE SCH MG: 20 TABLET ORAL at 08:49

## 2020-05-15 NOTE — NUR
RECEIVED BEDSIDE SHIFT REPORT FROM OFF GOING NURSE. PATIENT IS RESTING IN BED. NO ACUTE 
DISTRESS NOTED. CALL LIGHT WITHIN REACH. BED IN THE LOWEST POSITION. BED ALARM ON.

## 2020-05-15 NOTE — NUR
RECEIVED DC ORDER FROM DR. GUTIERREZ, PATIENT IS IN STABLE CONDITION. IV LINE TO LEFT FOREARM 
DISCONTINUED WITH TIP INTACT, PRESSURE APPLIED TO SITE, NO BLEEDING NOTED. DISCHARGE 
TEACHING PROVIDED TO PATIENT, HE VERBALIZED UNDERSTANDING. DISCHARGE FOLDER WITH PAPERWORK 
AND PRESCRIPTIONS ON HAND. PATIENT TRANSFER BACK TO [facility] VIA EMS PER PATIENTS 
REQUEST.

## 2020-05-15 NOTE — PROGRESS NOTE
DATE:    

 

SUBJECTIVE:  A 74-year-old gentleman came in with lymphangitis of the right index finger

with spread to the forearm and the arm.  Currently, the patient is doing better.

Lymphangitis in the forearm has decreased and almost gone.  The patient's redness in the

index finger is still present, but however, the patient is able to bend the DIP and PIP

joints without any problems, clinically doing much better. 

 

OBJECTIVE:  VITAL SIGNS:  Currently, temperature is 97.1, pulse of 81, respirations of

19, blood pressure is 110/76, pulse oximetry of 95%. 

HEENT:  Normocephalic and atraumatic.  Pupils are reactive. 

CVS:  S1 and S2 are normal.  Regular rate and rhythm. 

ABDOMEN:  Nontender, nondistended. 

EXTREMITIES:  Right index finger with erythema, redness, again can bend DIP and PIP

joints.  No clubbing.  No cyanosis.  No edema.  Good pulses. 

 

LABORATORY VALUES:  Trend down noted on CBC.  BUN and creatinine have normalized.

 

ASSESSMENT:  Mr. Matteo Umana with:

1. Right index finger cellulitis with lymphangitic spread.

2. Cat bite.

3. Acute kidney injury, improved.

4. Hypertension.

5. Polymyalgia rheumatica.

6. Debility.

 

PLAN:  Okay to discharge the patient on clindamycin and ciprofloxacin.  The patient's

white count is better.  The patient is to go home and follow up with me in about a

week's time.  Further recommendation per clinical course. 

 

 

 

 

______________________________

MD VINCENT Molina/JOON

D:  05/15/2020 06:54:00

T:  05/15/2020 07:12:50

Job #:  [id number]